# Patient Record
Sex: FEMALE | Race: WHITE | NOT HISPANIC OR LATINO | URBAN - METROPOLITAN AREA
[De-identification: names, ages, dates, MRNs, and addresses within clinical notes are randomized per-mention and may not be internally consistent; named-entity substitution may affect disease eponyms.]

---

## 2017-10-24 ENCOUNTER — OUTPATIENT (OUTPATIENT)
Dept: OUTPATIENT SERVICES | Facility: HOSPITAL | Age: 62
LOS: 1 days | Discharge: HOME | End: 2017-10-24

## 2017-10-27 DIAGNOSIS — Z85.3 PERSONAL HISTORY OF MALIGNANT NEOPLASM OF BREAST: ICD-10-CM

## 2017-10-27 DIAGNOSIS — Z12.11 ENCOUNTER FOR SCREENING FOR MALIGNANT NEOPLASM OF COLON: ICD-10-CM

## 2017-10-27 DIAGNOSIS — K64.1 SECOND DEGREE HEMORRHOIDS: ICD-10-CM

## 2017-10-27 DIAGNOSIS — E11.9 TYPE 2 DIABETES MELLITUS WITHOUT COMPLICATIONS: ICD-10-CM

## 2017-10-27 DIAGNOSIS — E78.00 PURE HYPERCHOLESTEROLEMIA, UNSPECIFIED: ICD-10-CM

## 2017-10-27 DIAGNOSIS — D12.5 BENIGN NEOPLASM OF SIGMOID COLON: ICD-10-CM

## 2017-10-27 DIAGNOSIS — Z86.010 PERSONAL HISTORY OF COLONIC POLYPS: ICD-10-CM

## 2022-12-06 ENCOUNTER — OUTPATIENT (OUTPATIENT)
Dept: OUTPATIENT SERVICES | Facility: HOSPITAL | Age: 67
LOS: 1 days | Discharge: HOME | End: 2022-12-06

## 2022-12-06 VITALS
OXYGEN SATURATION: 97 % | SYSTOLIC BLOOD PRESSURE: 131 MMHG | DIASTOLIC BLOOD PRESSURE: 78 MMHG | RESPIRATION RATE: 18 BRPM | HEART RATE: 70 BPM

## 2022-12-06 VITALS
RESPIRATION RATE: 18 BRPM | SYSTOLIC BLOOD PRESSURE: 175 MMHG | TEMPERATURE: 97 F | DIASTOLIC BLOOD PRESSURE: 95 MMHG | HEIGHT: 63 IN | WEIGHT: 195.11 LBS | HEART RATE: 81 BPM

## 2022-12-06 DIAGNOSIS — Z90.89 ACQUIRED ABSENCE OF OTHER ORGANS: Chronic | ICD-10-CM

## 2022-12-06 DIAGNOSIS — Z90.12 ACQUIRED ABSENCE OF LEFT BREAST AND NIPPLE: Chronic | ICD-10-CM

## 2022-12-06 PROCEDURE — 88305 TISSUE EXAM BY PATHOLOGIST: CPT | Mod: 26

## 2022-12-06 PROCEDURE — 88312 SPECIAL STAINS GROUP 1: CPT | Mod: 26

## 2022-12-06 NOTE — H&P PST ADULT - ASSESSMENT
Patient with STEVEN and Colon polyps . Patient for EGD/ Colonoscopy. Risks Benefits, Alternatives, explained.

## 2022-12-06 NOTE — ASU PATIENT PROFILE, ADULT - FALL HARM RISK - UNIVERSAL INTERVENTIONS
Bed in lowest position, wheels locked, appropriate side rails in place/Call bell, personal items and telephone in reach/Instruct patient to call for assistance before getting out of bed or chair/Non-slip footwear when patient is out of bed/[place] to call system/Physically safe environment - no spills, clutter or unnecessary equipment/Purposeful Proactive Rounding/Room/bathroom lighting operational, light cord in reach

## 2022-12-06 NOTE — ASU PATIENT PROFILE, ADULT - NSICDXPASTSURGICALHX_GEN_ALL_CORE_FT
PAST SURGICAL HISTORY:  H/O left mastectomy     History of tonsillectomy     Single delivery by  section

## 2022-12-06 NOTE — ASU DISCHARGE PLAN (ADULT/PEDIATRIC) - CARE PROVIDER_API CALL
Gamaliel Motley (DO)  Gastroenterology; Internal Medicine  [address] [place] [address]  Phone: [phone]  Fax: [phone]  Established Patient  Follow Up Time:

## 2022-12-06 NOTE — ASU DISCHARGE PLAN (ADULT/PEDIATRIC) - NS MD DC FALL RISK RISK
For information on Fall & Injury Prevention, visit: [email or web address] OR  [email or web address] OR  https://www.cdc.gov/steadi/patient.html

## 2022-12-06 NOTE — CHART NOTE - NSCHARTNOTEFT_GEN_A_CORE
PACU ANESTHESIA ADMISSION NOTE      Procedure:   Post op diagnosis:      ____  Intubated  TV:______       Rate: ______      FiO2: ______    __x__  Patent Airway    __x__  Full return of protective reflexes    __x__  Full recovery from anesthesia / back to baseline status    Vitals:  T(C): 36.2 (12-06-22 @ 10:30), Max: 36.2 (12-06-22 @ 10:30)  HR: 81 (12-06-22 @ 10:30) (81 - 81)  BP: 175/95 (12-06-22 @ 10:30) (175/95 - 175/95)  RR: 18 (12-06-22 @ 10:30) (18 - 18)  SpO2: --    Mental Status:  __x__ Awake   ___x__ Alert   _____ Drowsy   _____ Sedated    Nausea/Vomiting:  __x__ NO  ______Yes,   See Post - Op Orders          Pain Scale (0-10):  _____    Treatment: ____ None    __x__ See Post - Op/PCA Orders    Post - Operative Fluids:   ____ Oral   __x__ See Post - Op Orders    Plan: Discharge:   ____Home       _____Floor     _____Critical Care    _____  Other:_________________    Comments: Patient had smooth intraoperative event, no anesthesia complication.  PACU Vital signs: HR:   87         BP:     117   /  71        RR:   17          O2 Sat:  97     %     Temp 97.8

## 2022-12-07 LAB
SURGICAL PATHOLOGY STUDY: SIGNIFICANT CHANGE UP
SURGICAL PATHOLOGY STUDY: SIGNIFICANT CHANGE UP

## 2022-12-08 DIAGNOSIS — K21.00 GASTRO-ESOPHAGEAL REFLUX DISEASE WITH ESOPHAGITIS, WITHOUT BLEEDING: ICD-10-CM

## 2022-12-08 DIAGNOSIS — E11.9 TYPE 2 DIABETES MELLITUS WITHOUT COMPLICATIONS: ICD-10-CM

## 2022-12-08 DIAGNOSIS — K29.50 UNSPECIFIED CHRONIC GASTRITIS WITHOUT BLEEDING: ICD-10-CM

## 2022-12-08 DIAGNOSIS — K64.8 OTHER HEMORRHOIDS: ICD-10-CM

## 2022-12-08 DIAGNOSIS — K29.80 DUODENITIS WITHOUT BLEEDING: ICD-10-CM

## 2022-12-08 DIAGNOSIS — K57.30 DIVERTICULOSIS OF LARGE INTESTINE WITHOUT PERFORATION OR ABSCESS WITHOUT BLEEDING: ICD-10-CM

## 2022-12-08 DIAGNOSIS — K31.7 POLYP OF STOMACH AND DUODENUM: ICD-10-CM

## 2022-12-08 DIAGNOSIS — E78.00 PURE HYPERCHOLESTEROLEMIA, UNSPECIFIED: ICD-10-CM

## 2022-12-08 DIAGNOSIS — Z79.84 LONG TERM (CURRENT) USE OF ORAL HYPOGLYCEMIC DRUGS: ICD-10-CM

## 2022-12-08 DIAGNOSIS — Z91.013 ALLERGY TO SEAFOOD: ICD-10-CM

## 2022-12-08 DIAGNOSIS — Z85.79 PERSONAL HISTORY OF OTHER MALIGNANT NEOPLASMS OF LYMPHOID, HEMATOPOIETIC AND RELATED TISSUES: ICD-10-CM

## 2022-12-08 DIAGNOSIS — K63.5 POLYP OF COLON: ICD-10-CM

## 2022-12-08 DIAGNOSIS — D50.9 IRON DEFICIENCY ANEMIA, UNSPECIFIED: ICD-10-CM

## 2023-04-28 NOTE — PRE-ANESTHESIA EVALUATION ADULT - NSANTHAIRWAYFT_ENT_ALL_CORE
Despite multiple, daily attempts since 4/5/23, unable to reach patient to question him on 4/5/23 out of range INR. We will continue to try to reach patient to r/s INR asap. We will need a CURRENT INR in order to make proper adjustments in his coumadin dose.   I will close this encounter for administrative purposes.    FROM Cervical spine, TMD > 2 FB, Normal oral aperture.

## 2024-06-28 ENCOUNTER — INPATIENT (INPATIENT)
Facility: HOSPITAL | Age: 69
LOS: 3 days | Discharge: HOME | DRG: 440 | End: 2024-07-02
Attending: SURGERY | Admitting: SURGERY
Payer: MEDICARE

## 2024-06-28 VITALS
OXYGEN SATURATION: 99 % | WEIGHT: 186.07 LBS | HEART RATE: 90 BPM | RESPIRATION RATE: 19 BRPM | DIASTOLIC BLOOD PRESSURE: 62 MMHG | SYSTOLIC BLOOD PRESSURE: 104 MMHG | TEMPERATURE: 98 F

## 2024-06-28 DIAGNOSIS — Z90.12 ACQUIRED ABSENCE OF LEFT BREAST AND NIPPLE: Chronic | ICD-10-CM

## 2024-06-28 DIAGNOSIS — Z90.89 ACQUIRED ABSENCE OF OTHER ORGANS: Chronic | ICD-10-CM

## 2024-06-28 LAB
ALBUMIN SERPL ELPH-MCNC: 4.3 G/DL — SIGNIFICANT CHANGE UP (ref 3.5–5.2)
ALP SERPL-CCNC: 145 U/L — HIGH (ref 30–115)
ALT FLD-CCNC: 77 U/L — HIGH (ref 0–41)
ANION GAP SERPL CALC-SCNC: 11 MMOL/L — SIGNIFICANT CHANGE UP (ref 7–14)
APPEARANCE UR: CLEAR — SIGNIFICANT CHANGE UP
AST SERPL-CCNC: 48 U/L — HIGH (ref 0–41)
BASOPHILS # BLD AUTO: 0.02 K/UL — SIGNIFICANT CHANGE UP (ref 0–0.2)
BASOPHILS NFR BLD AUTO: 0.4 % — SIGNIFICANT CHANGE UP (ref 0–1)
BILIRUB DIRECT SERPL-MCNC: 0.4 MG/DL — HIGH (ref 0–0.3)
BILIRUB INDIRECT FLD-MCNC: 1.5 MG/DL — HIGH (ref 0.2–1.2)
BILIRUB SERPL-MCNC: 1.9 MG/DL — HIGH (ref 0.2–1.2)
BILIRUB UR-MCNC: NEGATIVE — SIGNIFICANT CHANGE UP
BUN SERPL-MCNC: 13 MG/DL — SIGNIFICANT CHANGE UP (ref 10–20)
CALCIUM SERPL-MCNC: 9.7 MG/DL — SIGNIFICANT CHANGE UP (ref 8.4–10.4)
CHLORIDE SERPL-SCNC: 97 MMOL/L — LOW (ref 98–110)
CO2 SERPL-SCNC: 23 MMOL/L — SIGNIFICANT CHANGE UP (ref 17–32)
COLOR SPEC: ABNORMAL
CREAT SERPL-MCNC: 0.7 MG/DL — SIGNIFICANT CHANGE UP (ref 0.7–1.5)
DIFF PNL FLD: NEGATIVE — SIGNIFICANT CHANGE UP
EGFR: 94 ML/MIN/1.73M2 — SIGNIFICANT CHANGE UP
EOSINOPHIL # BLD AUTO: 0.09 K/UL — SIGNIFICANT CHANGE UP (ref 0–0.7)
EOSINOPHIL NFR BLD AUTO: 1.8 % — SIGNIFICANT CHANGE UP (ref 0–8)
GLUCOSE SERPL-MCNC: 238 MG/DL — HIGH (ref 70–99)
GLUCOSE UR QL: >=1000 MG/DL
HCT VFR BLD CALC: 37.8 % — SIGNIFICANT CHANGE UP (ref 37–47)
HGB BLD-MCNC: 12.4 G/DL — SIGNIFICANT CHANGE UP (ref 12–16)
IMM GRANULOCYTES NFR BLD AUTO: 0.6 % — HIGH (ref 0.1–0.3)
KETONES UR-MCNC: NEGATIVE MG/DL — SIGNIFICANT CHANGE UP
LACTATE SERPL-SCNC: 2.5 MMOL/L — HIGH (ref 0.7–2)
LEUKOCYTE ESTERASE UR-ACNC: NEGATIVE — SIGNIFICANT CHANGE UP
LIDOCAIN IGE QN: 2055 U/L — HIGH (ref 7–60)
LYMPHOCYTES # BLD AUTO: 1.03 K/UL — LOW (ref 1.2–3.4)
LYMPHOCYTES # BLD AUTO: 20.6 % — SIGNIFICANT CHANGE UP (ref 20.5–51.1)
MCHC RBC-ENTMCNC: 29 PG — SIGNIFICANT CHANGE UP (ref 27–31)
MCHC RBC-ENTMCNC: 32.8 G/DL — SIGNIFICANT CHANGE UP (ref 32–37)
MCV RBC AUTO: 88.5 FL — SIGNIFICANT CHANGE UP (ref 81–99)
MONOCYTES # BLD AUTO: 0.73 K/UL — HIGH (ref 0.1–0.6)
MONOCYTES NFR BLD AUTO: 14.6 % — HIGH (ref 1.7–9.3)
NEUTROPHILS # BLD AUTO: 3.09 K/UL — SIGNIFICANT CHANGE UP (ref 1.4–6.5)
NEUTROPHILS NFR BLD AUTO: 62 % — SIGNIFICANT CHANGE UP (ref 42.2–75.2)
NITRITE UR-MCNC: NEGATIVE — SIGNIFICANT CHANGE UP
NRBC # BLD: 0 /100 WBCS — SIGNIFICANT CHANGE UP (ref 0–0)
PH UR: 5.5 — SIGNIFICANT CHANGE UP (ref 5–8)
PLATELET # BLD AUTO: 128 K/UL — LOW (ref 130–400)
PMV BLD: 12.6 FL — HIGH (ref 7.4–10.4)
POTASSIUM SERPL-MCNC: 4.3 MMOL/L — SIGNIFICANT CHANGE UP (ref 3.5–5)
POTASSIUM SERPL-SCNC: 4.3 MMOL/L — SIGNIFICANT CHANGE UP (ref 3.5–5)
PROT SERPL-MCNC: 6.5 G/DL — SIGNIFICANT CHANGE UP (ref 6–8)
PROT UR-MCNC: SIGNIFICANT CHANGE UP MG/DL
RBC # BLD: 4.27 M/UL — SIGNIFICANT CHANGE UP (ref 4.2–5.4)
RBC # FLD: 18.5 % — HIGH (ref 11.5–14.5)
SODIUM SERPL-SCNC: 131 MMOL/L — LOW (ref 135–146)
SP GR SPEC: >1.03 — HIGH (ref 1–1.03)
UROBILINOGEN FLD QL: 0.2 MG/DL — SIGNIFICANT CHANGE UP (ref 0.2–1)
WBC # BLD: 4.99 K/UL — SIGNIFICANT CHANGE UP (ref 4.8–10.8)
WBC # FLD AUTO: 4.99 K/UL — SIGNIFICANT CHANGE UP (ref 4.8–10.8)

## 2024-06-28 PROCEDURE — 99285 EMERGENCY DEPT VISIT HI MDM: CPT

## 2024-06-28 RX ORDER — DEXTROSE MONOHYDRATE AND SODIUM CHLORIDE 5; .3 G/100ML; G/100ML
1000 INJECTION, SOLUTION INTRAVENOUS ONCE
Refills: 0 | Status: COMPLETED | OUTPATIENT
Start: 2024-06-28 | End: 2024-06-28

## 2024-06-28 RX ORDER — ACETAMINOPHEN 325 MG
650 TABLET ORAL ONCE
Refills: 0 | Status: COMPLETED | OUTPATIENT
Start: 2024-06-28 | End: 2024-06-28

## 2024-06-28 RX ADMIN — DEXTROSE MONOHYDRATE AND SODIUM CHLORIDE 1000 MILLILITER(S): 5; .3 INJECTION, SOLUTION INTRAVENOUS at 23:33

## 2024-06-28 RX ADMIN — DEXTROSE MONOHYDRATE AND SODIUM CHLORIDE 1000 MILLILITER(S): 5; .3 INJECTION, SOLUTION INTRAVENOUS at 23:36

## 2024-06-28 RX ADMIN — Medication 650 MILLIGRAM(S): at 21:46

## 2024-06-28 RX ADMIN — DEXTROSE MONOHYDRATE AND SODIUM CHLORIDE 1000 MILLILITER(S): 5; .3 INJECTION, SOLUTION INTRAVENOUS at 22:33

## 2024-06-29 DIAGNOSIS — K85.90 ACUTE PANCREATITIS WITHOUT NECROSIS OR INFECTION, UNSPECIFIED: ICD-10-CM

## 2024-06-29 LAB
A1C WITH ESTIMATED AVERAGE GLUCOSE RESULT: 7.1 % — HIGH (ref 4–5.6)
ACANTHOCYTES BLD QL SMEAR: SLIGHT — SIGNIFICANT CHANGE UP
ALBUMIN SERPL ELPH-MCNC: 3.5 G/DL — SIGNIFICANT CHANGE UP (ref 3.5–5.2)
ALBUMIN SERPL ELPH-MCNC: 3.7 G/DL — SIGNIFICANT CHANGE UP (ref 3.5–5.2)
ALLERGY+IMMUNOLOGY DIAG STUDY NOTE: SIGNIFICANT CHANGE UP
ALP SERPL-CCNC: 101 U/L — SIGNIFICANT CHANGE UP (ref 30–115)
ALP SERPL-CCNC: 95 U/L — SIGNIFICANT CHANGE UP (ref 30–115)
ALT FLD-CCNC: 44 U/L — HIGH (ref 0–41)
ALT FLD-CCNC: 52 U/L — HIGH (ref 0–41)
ANION GAP SERPL CALC-SCNC: 10 MMOL/L — SIGNIFICANT CHANGE UP (ref 7–14)
ANION GAP SERPL CALC-SCNC: 9 MMOL/L — SIGNIFICANT CHANGE UP (ref 7–14)
APTT BLD: 27.4 SEC — SIGNIFICANT CHANGE UP (ref 27–39.2)
AST SERPL-CCNC: 17 U/L — SIGNIFICANT CHANGE UP (ref 0–41)
AST SERPL-CCNC: 21 U/L — SIGNIFICANT CHANGE UP (ref 0–41)
BASOPHILS # BLD AUTO: 0 K/UL — SIGNIFICANT CHANGE UP (ref 0–0.2)
BASOPHILS # BLD AUTO: 0.01 K/UL — SIGNIFICANT CHANGE UP (ref 0–0.2)
BASOPHILS NFR BLD AUTO: 0 % — SIGNIFICANT CHANGE UP (ref 0–1)
BASOPHILS NFR BLD AUTO: 0.4 % — SIGNIFICANT CHANGE UP (ref 0–1)
BILIRUB DIRECT SERPL-MCNC: 0.4 MG/DL — HIGH (ref 0–0.3)
BILIRUB DIRECT SERPL-MCNC: 0.4 MG/DL — HIGH (ref 0–0.3)
BILIRUB INDIRECT FLD-MCNC: 1.4 MG/DL — HIGH (ref 0.2–1.2)
BILIRUB INDIRECT FLD-MCNC: 1.4 MG/DL — HIGH (ref 0.2–1.2)
BILIRUB SERPL-MCNC: 1.8 MG/DL — HIGH (ref 0.2–1.2)
BILIRUB SERPL-MCNC: 1.8 MG/DL — HIGH (ref 0.2–1.2)
BLD GP AB SCN SERPL QL: SIGNIFICANT CHANGE UP
BUN SERPL-MCNC: 7 MG/DL — LOW (ref 10–20)
BUN SERPL-MCNC: 8 MG/DL — LOW (ref 10–20)
BURR CELLS BLD QL SMEAR: PRESENT — SIGNIFICANT CHANGE UP
CALCIUM SERPL-MCNC: 8.3 MG/DL — LOW (ref 8.4–10.4)
CALCIUM SERPL-MCNC: 8.3 MG/DL — LOW (ref 8.4–10.5)
CHLORIDE SERPL-SCNC: 103 MMOL/L — SIGNIFICANT CHANGE UP (ref 98–110)
CHLORIDE SERPL-SCNC: 105 MMOL/L — SIGNIFICANT CHANGE UP (ref 98–110)
CHOLEST SERPL-MCNC: 155 MG/DL — SIGNIFICANT CHANGE UP
CO2 SERPL-SCNC: 23 MMOL/L — SIGNIFICANT CHANGE UP (ref 17–32)
CO2 SERPL-SCNC: 23 MMOL/L — SIGNIFICANT CHANGE UP (ref 17–32)
CREAT SERPL-MCNC: 0.5 MG/DL — LOW (ref 0.7–1.5)
CREAT SERPL-MCNC: 0.6 MG/DL — LOW (ref 0.7–1.5)
DIR ANTIGLOB POLYSPECIFIC INTERPRETATION: SIGNIFICANT CHANGE UP
EGFR: 101 ML/MIN/1.73M2 — SIGNIFICANT CHANGE UP
EGFR: 97 ML/MIN/1.73M2 — SIGNIFICANT CHANGE UP
EOSINOPHIL # BLD AUTO: 0.13 K/UL — SIGNIFICANT CHANGE UP (ref 0–0.7)
EOSINOPHIL # BLD AUTO: 0.19 K/UL — SIGNIFICANT CHANGE UP (ref 0–0.7)
EOSINOPHIL NFR BLD AUTO: 5.2 % — SIGNIFICANT CHANGE UP (ref 0–8)
EOSINOPHIL NFR BLD AUTO: 6.7 % — SIGNIFICANT CHANGE UP (ref 0–8)
ESTIMATED AVERAGE GLUCOSE: 157 MG/DL — HIGH (ref 68–114)
GLUCOSE BLDC GLUCOMTR-MCNC: 155 MG/DL — HIGH (ref 70–99)
GLUCOSE BLDC GLUCOMTR-MCNC: 173 MG/DL — HIGH (ref 70–99)
GLUCOSE BLDC GLUCOMTR-MCNC: 184 MG/DL — HIGH (ref 70–99)
GLUCOSE BLDC GLUCOMTR-MCNC: 186 MG/DL — HIGH (ref 70–99)
GLUCOSE SERPL-MCNC: 155 MG/DL — HIGH (ref 70–99)
GLUCOSE SERPL-MCNC: 194 MG/DL — HIGH (ref 70–99)
HCT VFR BLD CALC: 29.6 % — LOW (ref 37–47)
HCT VFR BLD CALC: 31 % — LOW (ref 37–47)
HDLC SERPL-MCNC: 43 MG/DL — LOW
HGB BLD-MCNC: 10 G/DL — LOW (ref 12–16)
HGB BLD-MCNC: 9.7 G/DL — LOW (ref 12–16)
IMM GRANULOCYTES NFR BLD AUTO: 0 % — LOW (ref 0.1–0.3)
INR BLD: 1.14 RATIO — SIGNIFICANT CHANGE UP (ref 0.65–1.3)
LIPID PNL WITH DIRECT LDL SERPL: 76 MG/DL — SIGNIFICANT CHANGE UP
LYMPHOCYTES # BLD AUTO: 0.49 K/UL — LOW (ref 1.2–3.4)
LYMPHOCYTES # BLD AUTO: 0.84 K/UL — LOW (ref 1.2–3.4)
LYMPHOCYTES # BLD AUTO: 19 % — LOW (ref 20.5–51.1)
LYMPHOCYTES # BLD AUTO: 29.7 % — SIGNIFICANT CHANGE UP (ref 20.5–51.1)
MAGNESIUM SERPL-MCNC: 1.8 MG/DL — SIGNIFICANT CHANGE UP (ref 1.8–2.4)
MAGNESIUM SERPL-MCNC: 1.8 MG/DL — SIGNIFICANT CHANGE UP (ref 1.8–2.4)
MANUAL SMEAR VERIFICATION: SIGNIFICANT CHANGE UP
MCHC RBC-ENTMCNC: 28.3 PG — SIGNIFICANT CHANGE UP (ref 27–31)
MCHC RBC-ENTMCNC: 28.8 PG — SIGNIFICANT CHANGE UP (ref 27–31)
MCHC RBC-ENTMCNC: 32.3 G/DL — SIGNIFICANT CHANGE UP (ref 32–37)
MCHC RBC-ENTMCNC: 32.8 G/DL — SIGNIFICANT CHANGE UP (ref 32–37)
MCV RBC AUTO: 87.8 FL — SIGNIFICANT CHANGE UP (ref 81–99)
MCV RBC AUTO: 87.8 FL — SIGNIFICANT CHANGE UP (ref 81–99)
MONOCYTES # BLD AUTO: 0.26 K/UL — SIGNIFICANT CHANGE UP (ref 0.1–0.6)
MONOCYTES # BLD AUTO: 0.52 K/UL — SIGNIFICANT CHANGE UP (ref 0.1–0.6)
MONOCYTES NFR BLD AUTO: 10.3 % — HIGH (ref 1.7–9.3)
MONOCYTES NFR BLD AUTO: 18.4 % — HIGH (ref 1.7–9.3)
NEUTROPHILS # BLD AUTO: 1.27 K/UL — LOW (ref 1.4–6.5)
NEUTROPHILS # BLD AUTO: 1.64 K/UL — SIGNIFICANT CHANGE UP (ref 1.4–6.5)
NEUTROPHILS NFR BLD AUTO: 44.8 % — SIGNIFICANT CHANGE UP (ref 42.2–75.2)
NEUTROPHILS NFR BLD AUTO: 63.8 % — SIGNIFICANT CHANGE UP (ref 42.2–75.2)
NON HDL CHOLESTEROL: 112 MG/DL — SIGNIFICANT CHANGE UP
NRBC # BLD: 0 /100 WBCS — SIGNIFICANT CHANGE UP (ref 0–0)
PHOSPHATE SERPL-MCNC: 1.8 MG/DL — LOW (ref 2.1–4.9)
PHOSPHATE SERPL-MCNC: 1.9 MG/DL — LOW (ref 2.1–4.9)
PLAT MORPH BLD: NORMAL — SIGNIFICANT CHANGE UP
PLATELET # BLD AUTO: 74 K/UL — LOW (ref 130–400)
PLATELET # BLD AUTO: 97 K/UL — LOW (ref 130–400)
PMV BLD: 12.5 FL — HIGH (ref 7.4–10.4)
PMV BLD: SIGNIFICANT CHANGE UP (ref 7.4–10.4)
POIKILOCYTOSIS BLD QL AUTO: SLIGHT — SIGNIFICANT CHANGE UP
POLYCHROMASIA BLD QL SMEAR: SLIGHT — SIGNIFICANT CHANGE UP
POTASSIUM SERPL-MCNC: 3.6 MMOL/L — SIGNIFICANT CHANGE UP (ref 3.5–5)
POTASSIUM SERPL-MCNC: 3.9 MMOL/L — SIGNIFICANT CHANGE UP (ref 3.5–5)
POTASSIUM SERPL-SCNC: 3.6 MMOL/L — SIGNIFICANT CHANGE UP (ref 3.5–5)
POTASSIUM SERPL-SCNC: 3.9 MMOL/L — SIGNIFICANT CHANGE UP (ref 3.5–5)
PROT SERPL-MCNC: 5 G/DL — LOW (ref 6–8)
PROT SERPL-MCNC: 5.3 G/DL — LOW (ref 6–8)
PROTHROM AB SERPL-ACNC: 13 SEC — HIGH (ref 9.95–12.87)
RBC # BLD: 3.37 M/UL — LOW (ref 4.2–5.4)
RBC # BLD: 3.53 M/UL — LOW (ref 4.2–5.4)
RBC # FLD: 18.6 % — HIGH (ref 11.5–14.5)
RBC # FLD: 18.9 % — HIGH (ref 11.5–14.5)
RBC BLD AUTO: ABNORMAL
SODIUM SERPL-SCNC: 135 MMOL/L — SIGNIFICANT CHANGE UP (ref 135–146)
SODIUM SERPL-SCNC: 138 MMOL/L — SIGNIFICANT CHANGE UP (ref 135–146)
TRIGL SERPL-MCNC: 180 MG/DL — HIGH
VARIANT LYMPHS # BLD: 1.7 % — SIGNIFICANT CHANGE UP (ref 0–5)
WBC # BLD: 2.57 K/UL — LOW (ref 4.8–10.8)
WBC # BLD: 2.83 K/UL — LOW (ref 4.8–10.8)
WBC # FLD AUTO: 2.57 K/UL — LOW (ref 4.8–10.8)
WBC # FLD AUTO: 2.83 K/UL — LOW (ref 4.8–10.8)

## 2024-06-29 PROCEDURE — 74177 CT ABD & PELVIS W/CONTRAST: CPT | Mod: 26,MC

## 2024-06-29 PROCEDURE — 76705 ECHO EXAM OF ABDOMEN: CPT | Mod: 26

## 2024-06-29 PROCEDURE — 88304 TISSUE EXAM BY PATHOLOGIST: CPT

## 2024-06-29 PROCEDURE — 84100 ASSAY OF PHOSPHORUS: CPT

## 2024-06-29 PROCEDURE — 85025 COMPLETE CBC W/AUTO DIFF WBC: CPT

## 2024-06-29 PROCEDURE — 83036 HEMOGLOBIN GLYCOSYLATED A1C: CPT

## 2024-06-29 PROCEDURE — 85610 PROTHROMBIN TIME: CPT

## 2024-06-29 PROCEDURE — 36415 COLL VENOUS BLD VENIPUNCTURE: CPT

## 2024-06-29 PROCEDURE — 86870 RBC ANTIBODY IDENTIFICATION: CPT

## 2024-06-29 PROCEDURE — C1889: CPT

## 2024-06-29 PROCEDURE — 0001U RBC DNA HEA 35 AG 11 BLD GRP: CPT

## 2024-06-29 PROCEDURE — 86880 COOMBS TEST DIRECT: CPT

## 2024-06-29 PROCEDURE — 80048 BASIC METABOLIC PNL TOTAL CA: CPT

## 2024-06-29 PROCEDURE — S2900: CPT

## 2024-06-29 PROCEDURE — 86900 BLOOD TYPING SEROLOGIC ABO: CPT

## 2024-06-29 PROCEDURE — 99223 1ST HOSP IP/OBS HIGH 75: CPT

## 2024-06-29 PROCEDURE — 86850 RBC ANTIBODY SCREEN: CPT

## 2024-06-29 PROCEDURE — 80076 HEPATIC FUNCTION PANEL: CPT

## 2024-06-29 PROCEDURE — 85730 THROMBOPLASTIN TIME PARTIAL: CPT

## 2024-06-29 PROCEDURE — 82962 GLUCOSE BLOOD TEST: CPT

## 2024-06-29 PROCEDURE — 86077 PHYS BLOOD BANK SERV XMATCH: CPT

## 2024-06-29 PROCEDURE — C9399: CPT

## 2024-06-29 PROCEDURE — 86901 BLOOD TYPING SEROLOGIC RH(D): CPT

## 2024-06-29 PROCEDURE — 83735 ASSAY OF MAGNESIUM: CPT

## 2024-06-29 RX ORDER — PIPERACILLIN SODIUM AND TAZOBACTAM SODIUM 3; .375 G/15ML; G/15ML
3.38 INJECTION, POWDER, LYOPHILIZED, FOR SOLUTION INTRAVENOUS ONCE
Refills: 0 | Status: COMPLETED | OUTPATIENT
Start: 2024-06-29 | End: 2024-06-29

## 2024-06-29 RX ORDER — DEXTROSE 30 % IN WATER 30 %
12.5 VIAL (ML) INTRAVENOUS ONCE
Refills: 0 | Status: DISCONTINUED | OUTPATIENT
Start: 2024-06-29 | End: 2024-07-01

## 2024-06-29 RX ORDER — DEXTROSE MONOHYDRATE AND SODIUM CHLORIDE 5; .3 G/100ML; G/100ML
1000 INJECTION, SOLUTION INTRAVENOUS
Refills: 0 | Status: DISCONTINUED | OUTPATIENT
Start: 2024-06-29 | End: 2024-07-01

## 2024-06-29 RX ORDER — MONTELUKAST SODIUM 10 MG/1
10 TABLET, FILM COATED ORAL DAILY
Refills: 0 | Status: DISCONTINUED | OUTPATIENT
Start: 2024-06-29 | End: 2024-07-01

## 2024-06-29 RX ORDER — ACYCLOVIR SODIUM 50 MG/ML
400 VIAL (ML) INTRAVENOUS
Refills: 0 | Status: DISCONTINUED | OUTPATIENT
Start: 2024-06-29 | End: 2024-07-01

## 2024-06-29 RX ORDER — ASPIRIN 325 MG/1
81 TABLET, FILM COATED ORAL DAILY
Refills: 0 | Status: DISCONTINUED | OUTPATIENT
Start: 2024-06-29 | End: 2024-07-01

## 2024-06-29 RX ORDER — DEXTROSE MONOHYDRATE AND SODIUM CHLORIDE 5; .3 G/100ML; G/100ML
1000 INJECTION, SOLUTION INTRAVENOUS
Refills: 0 | Status: DISCONTINUED | OUTPATIENT
Start: 2024-06-29 | End: 2024-06-30

## 2024-06-29 RX ORDER — DEXTROSE MONOHYDRATE AND SODIUM CHLORIDE 5; .3 G/100ML; G/100ML
1000 INJECTION, SOLUTION INTRAVENOUS ONCE
Refills: 0 | Status: COMPLETED | OUTPATIENT
Start: 2024-06-29 | End: 2024-06-29

## 2024-06-29 RX ORDER — GLUCAGON HYDROCHLORIDE 1 MG/ML
1 INJECTION, POWDER, FOR SOLUTION INTRAMUSCULAR; INTRAVENOUS; SUBCUTANEOUS ONCE
Refills: 0 | Status: DISCONTINUED | OUTPATIENT
Start: 2024-06-29 | End: 2024-07-01

## 2024-06-29 RX ORDER — DEXTROSE 30 % IN WATER 30 %
15 VIAL (ML) INTRAVENOUS ONCE
Refills: 0 | Status: DISCONTINUED | OUTPATIENT
Start: 2024-06-29 | End: 2024-07-01

## 2024-06-29 RX ORDER — DEXTROSE MONOHYDRATE 100 MG/ML
125 INJECTION, SOLUTION INTRAVENOUS ONCE
Refills: 0 | Status: DISCONTINUED | OUTPATIENT
Start: 2024-06-29 | End: 2024-07-01

## 2024-06-29 RX ORDER — ENOXAPARIN SODIUM 100 MG/ML
40 INJECTION SUBCUTANEOUS EVERY 24 HOURS
Refills: 0 | Status: DISCONTINUED | OUTPATIENT
Start: 2024-06-29 | End: 2024-07-01

## 2024-06-29 RX ORDER — DEXTROSE 30 % IN WATER 30 %
25 VIAL (ML) INTRAVENOUS ONCE
Refills: 0 | Status: DISCONTINUED | OUTPATIENT
Start: 2024-06-29 | End: 2024-07-01

## 2024-06-29 RX ORDER — INSULIN LISPRO 100 [IU]/ML
INJECTION, SOLUTION SUBCUTANEOUS
Refills: 0 | Status: DISCONTINUED | OUTPATIENT
Start: 2024-06-29 | End: 2024-07-01

## 2024-06-29 RX ORDER — PANTOPRAZOLE SODIUM 40 MG/10ML
40 INJECTION, POWDER, FOR SOLUTION INTRAVENOUS
Refills: 0 | Status: DISCONTINUED | OUTPATIENT
Start: 2024-06-29 | End: 2024-07-01

## 2024-06-29 RX ORDER — POTASSIUM PHOSPHATE, MONOBASIC POTASSIUM PHOSPHATE, DIBASIC INJECTION, 236; 224 MG/ML; MG/ML
15 SOLUTION, CONCENTRATE INTRAVENOUS ONCE
Refills: 0 | Status: COMPLETED | OUTPATIENT
Start: 2024-06-29 | End: 2024-06-30

## 2024-06-29 RX ORDER — PIPERACILLIN SODIUM AND TAZOBACTAM SODIUM 3; .375 G/15ML; G/15ML
3.38 INJECTION, POWDER, LYOPHILIZED, FOR SOLUTION INTRAVENOUS EVERY 8 HOURS
Refills: 0 | Status: DISCONTINUED | OUTPATIENT
Start: 2024-06-29 | End: 2024-07-01

## 2024-06-29 RX ORDER — ACETAMINOPHEN 325 MG
650 TABLET ORAL EVERY 6 HOURS
Refills: 0 | Status: DISCONTINUED | OUTPATIENT
Start: 2024-06-29 | End: 2024-07-01

## 2024-06-29 RX ORDER — ATORVASTATIN CALCIUM 20 MG/1
10 TABLET, FILM COATED ORAL AT BEDTIME
Refills: 0 | Status: DISCONTINUED | OUTPATIENT
Start: 2024-06-29 | End: 2024-07-01

## 2024-06-29 RX ORDER — LOSARTAN POTASSIUM 100 MG/1
25 TABLET, FILM COATED ORAL DAILY
Refills: 0 | Status: DISCONTINUED | OUTPATIENT
Start: 2024-06-29 | End: 2024-07-01

## 2024-06-29 RX ORDER — INSULIN LISPRO 100 [IU]/ML
INJECTION, SOLUTION SUBCUTANEOUS EVERY 4 HOURS
Refills: 0 | Status: DISCONTINUED | OUTPATIENT
Start: 2024-06-29 | End: 2024-06-29

## 2024-06-29 RX ORDER — HYDROMORPHONE HCL 0.2 MG/ML
0.5 INJECTION, SOLUTION INTRAVENOUS EVERY 4 HOURS
Refills: 0 | Status: DISCONTINUED | OUTPATIENT
Start: 2024-06-29 | End: 2024-07-01

## 2024-06-29 RX ORDER — KETOROLAC TROMETHAMINE 30 MG/ML
15 INJECTION, SOLUTION INTRAMUSCULAR EVERY 6 HOURS
Refills: 0 | Status: DISCONTINUED | OUTPATIENT
Start: 2024-06-29 | End: 2024-07-01

## 2024-06-29 RX ADMIN — DEXTROSE MONOHYDRATE AND SODIUM CHLORIDE 1000 MILLILITER(S): 5; .3 INJECTION, SOLUTION INTRAVENOUS at 00:36

## 2024-06-29 RX ADMIN — Medication 400 MILLIGRAM(S): at 08:27

## 2024-06-29 RX ADMIN — DEXTROSE MONOHYDRATE AND SODIUM CHLORIDE 150 MILLILITER(S): 5; .3 INJECTION, SOLUTION INTRAVENOUS at 04:40

## 2024-06-29 RX ADMIN — ATORVASTATIN CALCIUM 10 MILLIGRAM(S): 20 TABLET, FILM COATED ORAL at 03:52

## 2024-06-29 RX ADMIN — PIPERACILLIN SODIUM AND TAZOBACTAM SODIUM 25 GRAM(S): 3; .375 INJECTION, POWDER, LYOPHILIZED, FOR SOLUTION INTRAVENOUS at 06:36

## 2024-06-29 RX ADMIN — DEXTROSE MONOHYDRATE AND SODIUM CHLORIDE 150 MILLILITER(S): 5; .3 INJECTION, SOLUTION INTRAVENOUS at 07:26

## 2024-06-29 RX ADMIN — ENOXAPARIN SODIUM 40 MILLIGRAM(S): 100 INJECTION SUBCUTANEOUS at 22:13

## 2024-06-29 RX ADMIN — ATORVASTATIN CALCIUM 10 MILLIGRAM(S): 20 TABLET, FILM COATED ORAL at 22:14

## 2024-06-29 RX ADMIN — INSULIN LISPRO 2: 100 INJECTION, SOLUTION SUBCUTANEOUS at 12:32

## 2024-06-29 RX ADMIN — PIPERACILLIN SODIUM AND TAZOBACTAM SODIUM 25 GRAM(S): 3; .375 INJECTION, POWDER, LYOPHILIZED, FOR SOLUTION INTRAVENOUS at 22:12

## 2024-06-29 RX ADMIN — Medication 400 MILLIGRAM(S): at 17:35

## 2024-06-29 RX ADMIN — PIPERACILLIN SODIUM AND TAZOBACTAM SODIUM 25 GRAM(S): 3; .375 INJECTION, POWDER, LYOPHILIZED, FOR SOLUTION INTRAVENOUS at 14:21

## 2024-06-29 RX ADMIN — PANTOPRAZOLE SODIUM 40 MILLIGRAM(S): 40 INJECTION, POWDER, FOR SOLUTION INTRAVENOUS at 07:26

## 2024-06-29 RX ADMIN — Medication 1 APPLICATION(S): at 06:18

## 2024-06-29 RX ADMIN — DEXTROSE MONOHYDRATE AND SODIUM CHLORIDE 1000 MILLILITER(S): 5; .3 INJECTION, SOLUTION INTRAVENOUS at 02:52

## 2024-06-29 RX ADMIN — INSULIN LISPRO 2: 100 INJECTION, SOLUTION SUBCUTANEOUS at 08:27

## 2024-06-29 RX ADMIN — PIPERACILLIN SODIUM AND TAZOBACTAM SODIUM 200 GRAM(S): 3; .375 INJECTION, POWDER, LYOPHILIZED, FOR SOLUTION INTRAVENOUS at 02:53

## 2024-06-29 RX ADMIN — DEXTROSE MONOHYDRATE AND SODIUM CHLORIDE 1000 MILLILITER(S): 5; .3 INJECTION, SOLUTION INTRAVENOUS at 03:52

## 2024-06-29 RX ADMIN — INSULIN LISPRO 2: 100 INJECTION, SOLUTION SUBCUTANEOUS at 17:35

## 2024-06-29 RX ADMIN — ASPIRIN 81 MILLIGRAM(S): 325 TABLET, FILM COATED ORAL at 03:52

## 2024-06-30 PROBLEM — E78.00 PURE HYPERCHOLESTEROLEMIA, UNSPECIFIED: Chronic | Status: ACTIVE | Noted: 2022-12-06

## 2024-06-30 PROBLEM — E11.9 TYPE 2 DIABETES MELLITUS WITHOUT COMPLICATIONS: Chronic | Status: ACTIVE | Noted: 2022-12-06

## 2024-06-30 LAB
ALBUMIN SERPL ELPH-MCNC: 3.8 G/DL — SIGNIFICANT CHANGE UP (ref 3.5–5.2)
ALP SERPL-CCNC: 107 U/L — SIGNIFICANT CHANGE UP (ref 30–115)
ALT FLD-CCNC: 37 U/L — SIGNIFICANT CHANGE UP (ref 0–41)
ANION GAP SERPL CALC-SCNC: 9 MMOL/L — SIGNIFICANT CHANGE UP (ref 7–14)
APTT BLD: 30.9 SEC — SIGNIFICANT CHANGE UP (ref 27–39.2)
AST SERPL-CCNC: 15 U/L — SIGNIFICANT CHANGE UP (ref 0–41)
BASOPHILS # BLD AUTO: 0.01 K/UL — SIGNIFICANT CHANGE UP (ref 0–0.2)
BASOPHILS NFR BLD AUTO: 0.4 % — SIGNIFICANT CHANGE UP (ref 0–1)
BILIRUB DIRECT SERPL-MCNC: 0.3 MG/DL — SIGNIFICANT CHANGE UP (ref 0–0.3)
BILIRUB INDIRECT FLD-MCNC: 1.5 MG/DL — HIGH (ref 0.2–1.2)
BILIRUB SERPL-MCNC: 1.8 MG/DL — HIGH (ref 0.2–1.2)
BUN SERPL-MCNC: 5 MG/DL — LOW (ref 10–20)
CALCIUM SERPL-MCNC: 8.5 MG/DL — SIGNIFICANT CHANGE UP (ref 8.4–10.4)
CHLORIDE SERPL-SCNC: 103 MMOL/L — SIGNIFICANT CHANGE UP (ref 98–110)
CO2 SERPL-SCNC: 23 MMOL/L — SIGNIFICANT CHANGE UP (ref 17–32)
CREAT SERPL-MCNC: 0.6 MG/DL — LOW (ref 0.7–1.5)
EGFR: 97 ML/MIN/1.73M2 — SIGNIFICANT CHANGE UP
EOSINOPHIL # BLD AUTO: 0.15 K/UL — SIGNIFICANT CHANGE UP (ref 0–0.7)
EOSINOPHIL NFR BLD AUTO: 6 % — SIGNIFICANT CHANGE UP (ref 0–8)
GLUCOSE BLDC GLUCOMTR-MCNC: 116 MG/DL — HIGH (ref 70–99)
GLUCOSE BLDC GLUCOMTR-MCNC: 129 MG/DL — HIGH (ref 70–99)
GLUCOSE BLDC GLUCOMTR-MCNC: 143 MG/DL — HIGH (ref 70–99)
GLUCOSE BLDC GLUCOMTR-MCNC: 146 MG/DL — HIGH (ref 70–99)
GLUCOSE SERPL-MCNC: 118 MG/DL — HIGH (ref 70–99)
HCT VFR BLD CALC: 33.7 % — LOW (ref 37–47)
HGB BLD-MCNC: 10.8 G/DL — LOW (ref 12–16)
IMM GRANULOCYTES NFR BLD AUTO: 0 % — LOW (ref 0.1–0.3)
INR BLD: 1.11 RATIO — SIGNIFICANT CHANGE UP (ref 0.65–1.3)
LYMPHOCYTES # BLD AUTO: 0.96 K/UL — LOW (ref 1.2–3.4)
LYMPHOCYTES # BLD AUTO: 38.7 % — SIGNIFICANT CHANGE UP (ref 20.5–51.1)
MAGNESIUM SERPL-MCNC: 2 MG/DL — SIGNIFICANT CHANGE UP (ref 1.8–2.4)
MCHC RBC-ENTMCNC: 28.4 PG — SIGNIFICANT CHANGE UP (ref 27–31)
MCHC RBC-ENTMCNC: 32 G/DL — SIGNIFICANT CHANGE UP (ref 32–37)
MCV RBC AUTO: 88.7 FL — SIGNIFICANT CHANGE UP (ref 81–99)
MONOCYTES # BLD AUTO: 0.44 K/UL — SIGNIFICANT CHANGE UP (ref 0.1–0.6)
MONOCYTES NFR BLD AUTO: 17.7 % — HIGH (ref 1.7–9.3)
NEUTROPHILS # BLD AUTO: 0.92 K/UL — LOW (ref 1.4–6.5)
NEUTROPHILS NFR BLD AUTO: 37.2 % — LOW (ref 42.2–75.2)
NRBC # BLD: 0 /100 WBCS — SIGNIFICANT CHANGE UP (ref 0–0)
PHOSPHATE SERPL-MCNC: 2 MG/DL — LOW (ref 2.1–4.9)
PLATELET # BLD AUTO: 84 K/UL — LOW (ref 130–400)
PMV BLD: 13.1 FL — HIGH (ref 7.4–10.4)
POTASSIUM SERPL-MCNC: 4.2 MMOL/L — SIGNIFICANT CHANGE UP (ref 3.5–5)
POTASSIUM SERPL-SCNC: 4.2 MMOL/L — SIGNIFICANT CHANGE UP (ref 3.5–5)
PROT SERPL-MCNC: 5.8 G/DL — LOW (ref 6–8)
PROTHROM AB SERPL-ACNC: 12.7 SEC — SIGNIFICANT CHANGE UP (ref 9.95–12.87)
RBC # BLD: 3.8 M/UL — LOW (ref 4.2–5.4)
RBC # FLD: 18.3 % — HIGH (ref 11.5–14.5)
SODIUM SERPL-SCNC: 135 MMOL/L — SIGNIFICANT CHANGE UP (ref 135–146)
WBC # BLD: 2.48 K/UL — LOW (ref 4.8–10.8)
WBC # FLD AUTO: 2.48 K/UL — LOW (ref 4.8–10.8)

## 2024-06-30 PROCEDURE — 99232 SBSQ HOSP IP/OBS MODERATE 35: CPT | Mod: 24,25

## 2024-06-30 RX ORDER — DEXTROSE MONOHYDRATE AND SODIUM CHLORIDE 5; .3 G/100ML; G/100ML
1000 INJECTION, SOLUTION INTRAVENOUS
Refills: 0 | Status: DISCONTINUED | OUTPATIENT
Start: 2024-06-30 | End: 2024-07-01

## 2024-06-30 RX ORDER — POTASSIUM PHOSPHATE, MONOBASIC POTASSIUM PHOSPHATE, DIBASIC INJECTION, 236; 224 MG/ML; MG/ML
30 SOLUTION, CONCENTRATE INTRAVENOUS ONCE
Refills: 0 | Status: COMPLETED | OUTPATIENT
Start: 2024-06-30 | End: 2024-07-01

## 2024-06-30 RX ORDER — POLYETHYLENE GLYCOL 3350 1 G/G
17 POWDER ORAL DAILY
Refills: 0 | Status: DISCONTINUED | OUTPATIENT
Start: 2024-06-30 | End: 2024-07-01

## 2024-06-30 RX ORDER — DEXTROSE MONOHYDRATE AND SODIUM CHLORIDE 5; .3 G/100ML; G/100ML
1000 INJECTION, SOLUTION INTRAVENOUS
Refills: 0 | Status: DISCONTINUED | OUTPATIENT
Start: 2024-06-30 | End: 2024-06-30

## 2024-06-30 RX ORDER — SENNOSIDES 8.6 MG
2 TABLET ORAL AT BEDTIME
Refills: 0 | Status: DISCONTINUED | OUTPATIENT
Start: 2024-06-30 | End: 2024-07-01

## 2024-06-30 RX ADMIN — LOSARTAN POTASSIUM 25 MILLIGRAM(S): 100 TABLET, FILM COATED ORAL at 05:37

## 2024-06-30 RX ADMIN — MONTELUKAST SODIUM 10 MILLIGRAM(S): 10 TABLET, FILM COATED ORAL at 11:03

## 2024-06-30 RX ADMIN — PIPERACILLIN SODIUM AND TAZOBACTAM SODIUM 25 GRAM(S): 3; .375 INJECTION, POWDER, LYOPHILIZED, FOR SOLUTION INTRAVENOUS at 05:37

## 2024-06-30 RX ADMIN — DEXTROSE MONOHYDRATE AND SODIUM CHLORIDE 120 MILLILITER(S): 5; .3 INJECTION, SOLUTION INTRAVENOUS at 22:49

## 2024-06-30 RX ADMIN — Medication 1 APPLICATION(S): at 06:08

## 2024-06-30 RX ADMIN — Medication 63.75 MILLIMOLE(S): at 00:17

## 2024-06-30 RX ADMIN — ENOXAPARIN SODIUM 40 MILLIGRAM(S): 100 INJECTION SUBCUTANEOUS at 21:23

## 2024-06-30 RX ADMIN — PIPERACILLIN SODIUM AND TAZOBACTAM SODIUM 25 GRAM(S): 3; .375 INJECTION, POWDER, LYOPHILIZED, FOR SOLUTION INTRAVENOUS at 21:19

## 2024-06-30 RX ADMIN — Medication 400 MILLIGRAM(S): at 17:15

## 2024-06-30 RX ADMIN — Medication 400 MILLIGRAM(S): at 05:37

## 2024-06-30 RX ADMIN — ATORVASTATIN CALCIUM 10 MILLIGRAM(S): 20 TABLET, FILM COATED ORAL at 21:22

## 2024-06-30 RX ADMIN — PIPERACILLIN SODIUM AND TAZOBACTAM SODIUM 25 GRAM(S): 3; .375 INJECTION, POWDER, LYOPHILIZED, FOR SOLUTION INTRAVENOUS at 13:48

## 2024-06-30 RX ADMIN — ASPIRIN 81 MILLIGRAM(S): 325 TABLET, FILM COATED ORAL at 11:03

## 2024-06-30 RX ADMIN — POTASSIUM PHOSPHATE, MONOBASIC POTASSIUM PHOSPHATE, DIBASIC INJECTION, 63.75 MILLIMOLE(S): 236; 224 SOLUTION, CONCENTRATE INTRAVENOUS at 03:06

## 2024-06-30 RX ADMIN — PANTOPRAZOLE SODIUM 40 MILLIGRAM(S): 40 INJECTION, POWDER, FOR SOLUTION INTRAVENOUS at 08:17

## 2024-07-01 ENCOUNTER — RESULT REVIEW (OUTPATIENT)
Age: 69
End: 2024-07-01

## 2024-07-01 LAB
A1C WITH ESTIMATED AVERAGE GLUCOSE RESULT: 7.3 % — HIGH (ref 4–5.6)
ALBUMIN SERPL ELPH-MCNC: 3.6 G/DL — SIGNIFICANT CHANGE UP (ref 3.5–5.2)
ALP SERPL-CCNC: 105 U/L — SIGNIFICANT CHANGE UP (ref 30–115)
ALT FLD-CCNC: 40 U/L — SIGNIFICANT CHANGE UP (ref 0–41)
ANION GAP SERPL CALC-SCNC: 8 MMOL/L — SIGNIFICANT CHANGE UP (ref 7–14)
AST SERPL-CCNC: 33 U/L — SIGNIFICANT CHANGE UP (ref 0–41)
BASOPHILS # BLD AUTO: 0.01 K/UL — SIGNIFICANT CHANGE UP (ref 0–0.2)
BASOPHILS NFR BLD AUTO: 0.4 % — SIGNIFICANT CHANGE UP (ref 0–1)
BILIRUB DIRECT SERPL-MCNC: 0.3 MG/DL — SIGNIFICANT CHANGE UP (ref 0–0.3)
BILIRUB INDIRECT FLD-MCNC: 1.1 MG/DL — SIGNIFICANT CHANGE UP (ref 0.2–1.2)
BILIRUB SERPL-MCNC: 1.4 MG/DL — HIGH (ref 0.2–1.2)
BUN SERPL-MCNC: 6 MG/DL — LOW (ref 10–20)
CALCIUM SERPL-MCNC: 7.9 MG/DL — LOW (ref 8.4–10.5)
CHLORIDE SERPL-SCNC: 105 MMOL/L — SIGNIFICANT CHANGE UP (ref 98–110)
CO2 SERPL-SCNC: 22 MMOL/L — SIGNIFICANT CHANGE UP (ref 17–32)
CREAT SERPL-MCNC: 0.5 MG/DL — LOW (ref 0.7–1.5)
EGFR: 101 ML/MIN/1.73M2 — SIGNIFICANT CHANGE UP
EOSINOPHIL # BLD AUTO: 0 K/UL — SIGNIFICANT CHANGE UP (ref 0–0.7)
EOSINOPHIL NFR BLD AUTO: 0 % — SIGNIFICANT CHANGE UP (ref 0–8)
ESTIMATED AVERAGE GLUCOSE: 163 MG/DL — HIGH (ref 68–114)
GLUCOSE BLDC GLUCOMTR-MCNC: 107 MG/DL — HIGH (ref 70–99)
GLUCOSE BLDC GLUCOMTR-MCNC: 118 MG/DL — HIGH (ref 70–99)
GLUCOSE BLDC GLUCOMTR-MCNC: 121 MG/DL — HIGH (ref 70–99)
GLUCOSE BLDC GLUCOMTR-MCNC: 160 MG/DL — HIGH (ref 70–99)
GLUCOSE BLDC GLUCOMTR-MCNC: 174 MG/DL — HIGH (ref 70–99)
GLUCOSE SERPL-MCNC: 186 MG/DL — HIGH (ref 70–99)
HCT VFR BLD CALC: 34 % — LOW (ref 37–47)
HGB BLD-MCNC: 10.9 G/DL — LOW (ref 12–16)
IMM GRANULOCYTES NFR BLD AUTO: 0 % — LOW (ref 0.1–0.3)
LYMPHOCYTES # BLD AUTO: 0.57 K/UL — LOW (ref 1.2–3.4)
LYMPHOCYTES # BLD AUTO: 22.3 % — SIGNIFICANT CHANGE UP (ref 20.5–51.1)
MAGNESIUM SERPL-MCNC: 2 MG/DL — SIGNIFICANT CHANGE UP (ref 1.8–2.4)
MCHC RBC-ENTMCNC: 28.5 PG — SIGNIFICANT CHANGE UP (ref 27–31)
MCHC RBC-ENTMCNC: 32.1 G/DL — SIGNIFICANT CHANGE UP (ref 32–37)
MCV RBC AUTO: 89 FL — SIGNIFICANT CHANGE UP (ref 81–99)
MONOCYTES # BLD AUTO: 0.38 K/UL — SIGNIFICANT CHANGE UP (ref 0.1–0.6)
MONOCYTES NFR BLD AUTO: 14.8 % — HIGH (ref 1.7–9.3)
NEUTROPHILS # BLD AUTO: 1.6 K/UL — SIGNIFICANT CHANGE UP (ref 1.4–6.5)
NEUTROPHILS NFR BLD AUTO: 62.5 % — SIGNIFICANT CHANGE UP (ref 42.2–75.2)
NRBC # BLD: 0 /100 WBCS — SIGNIFICANT CHANGE UP (ref 0–0)
PHOSPHATE SERPL-MCNC: 2.3 MG/DL — SIGNIFICANT CHANGE UP (ref 2.1–4.9)
PLATELET # BLD AUTO: 93 K/UL — LOW (ref 130–400)
PMV BLD: 12.1 FL — HIGH (ref 7.4–10.4)
POTASSIUM SERPL-MCNC: 4.3 MMOL/L — SIGNIFICANT CHANGE UP (ref 3.5–5)
POTASSIUM SERPL-SCNC: 4.3 MMOL/L — SIGNIFICANT CHANGE UP (ref 3.5–5)
PROT SERPL-MCNC: 5.4 G/DL — LOW (ref 6–8)
RBC # BLD: 3.82 M/UL — LOW (ref 4.2–5.4)
RBC # FLD: 18.1 % — HIGH (ref 11.5–14.5)
SODIUM SERPL-SCNC: 135 MMOL/L — SIGNIFICANT CHANGE UP (ref 135–146)
WBC # BLD: 2.56 K/UL — LOW (ref 4.8–10.8)
WBC # FLD AUTO: 2.56 K/UL — LOW (ref 4.8–10.8)

## 2024-07-01 PROCEDURE — S2900 ROBOTIC SURGICAL SYSTEM: CPT | Mod: NC

## 2024-07-01 PROCEDURE — 88304 TISSUE EXAM BY PATHOLOGIST: CPT | Mod: 26

## 2024-07-01 PROCEDURE — 47562 LAPAROSCOPIC CHOLECYSTECTOMY: CPT

## 2024-07-01 RX ORDER — PIPERACILLIN SODIUM AND TAZOBACTAM SODIUM 3; .375 G/15ML; G/15ML
3.38 INJECTION, POWDER, LYOPHILIZED, FOR SOLUTION INTRAVENOUS EVERY 8 HOURS
Refills: 0 | Status: DISCONTINUED | OUTPATIENT
Start: 2024-07-01 | End: 2024-07-02

## 2024-07-01 RX ORDER — PANTOPRAZOLE SODIUM 40 MG/10ML
40 INJECTION, POWDER, FOR SOLUTION INTRAVENOUS
Refills: 0 | Status: DISCONTINUED | OUTPATIENT
Start: 2024-07-01 | End: 2024-07-02

## 2024-07-01 RX ORDER — KETOROLAC TROMETHAMINE 30 MG/ML
15 INJECTION, SOLUTION INTRAMUSCULAR EVERY 6 HOURS
Refills: 0 | Status: DISCONTINUED | OUTPATIENT
Start: 2024-07-01 | End: 2024-07-02

## 2024-07-01 RX ORDER — SENNOSIDES 8.6 MG
2 TABLET ORAL AT BEDTIME
Refills: 0 | Status: DISCONTINUED | OUTPATIENT
Start: 2024-07-01 | End: 2024-07-02

## 2024-07-01 RX ORDER — ASPIRIN 325 MG/1
81 TABLET, FILM COATED ORAL DAILY
Refills: 0 | Status: DISCONTINUED | OUTPATIENT
Start: 2024-07-01 | End: 2024-07-01

## 2024-07-01 RX ORDER — LOSARTAN POTASSIUM 100 MG/1
25 TABLET, FILM COATED ORAL DAILY
Refills: 0 | Status: DISCONTINUED | OUTPATIENT
Start: 2024-07-01 | End: 2024-07-02

## 2024-07-01 RX ORDER — MONTELUKAST SODIUM 10 MG/1
10 TABLET, FILM COATED ORAL DAILY
Refills: 0 | Status: DISCONTINUED | OUTPATIENT
Start: 2024-07-01 | End: 2024-07-02

## 2024-07-01 RX ORDER — HYDROMORPHONE HCL 0.2 MG/ML
0.5 INJECTION, SOLUTION INTRAVENOUS
Refills: 0 | Status: DISCONTINUED | OUTPATIENT
Start: 2024-07-01 | End: 2024-07-01

## 2024-07-01 RX ORDER — ATORVASTATIN CALCIUM 20 MG/1
10 TABLET, FILM COATED ORAL AT BEDTIME
Refills: 0 | Status: DISCONTINUED | OUTPATIENT
Start: 2024-07-01 | End: 2024-07-02

## 2024-07-01 RX ORDER — INSULIN LISPRO 100 [IU]/ML
INJECTION, SOLUTION SUBCUTANEOUS
Refills: 0 | Status: DISCONTINUED | OUTPATIENT
Start: 2024-07-01 | End: 2024-07-02

## 2024-07-01 RX ORDER — ACETAMINOPHEN 325 MG
650 TABLET ORAL EVERY 6 HOURS
Refills: 0 | Status: DISCONTINUED | OUTPATIENT
Start: 2024-07-01 | End: 2024-07-02

## 2024-07-01 RX ORDER — HYDROMORPHONE HCL 0.2 MG/ML
1 INJECTION, SOLUTION INTRAVENOUS
Refills: 0 | Status: DISCONTINUED | OUTPATIENT
Start: 2024-07-01 | End: 2024-07-01

## 2024-07-01 RX ORDER — ONDANSETRON HYDROCHLORIDE 2 MG/ML
4 INJECTION INTRAMUSCULAR; INTRAVENOUS ONCE
Refills: 0 | Status: DISCONTINUED | OUTPATIENT
Start: 2024-07-01 | End: 2024-07-01

## 2024-07-01 RX ORDER — ACYCLOVIR SODIUM 50 MG/ML
400 VIAL (ML) INTRAVENOUS
Refills: 0 | Status: DISCONTINUED | OUTPATIENT
Start: 2024-07-01 | End: 2024-07-02

## 2024-07-01 RX ORDER — ENOXAPARIN SODIUM 100 MG/ML
40 INJECTION SUBCUTANEOUS EVERY 24 HOURS
Refills: 0 | Status: DISCONTINUED | OUTPATIENT
Start: 2024-07-01 | End: 2024-07-02

## 2024-07-01 RX ORDER — POLYETHYLENE GLYCOL 3350 1 G/G
17 POWDER ORAL DAILY
Refills: 0 | Status: DISCONTINUED | OUTPATIENT
Start: 2024-07-01 | End: 2024-07-02

## 2024-07-01 RX ADMIN — PIPERACILLIN SODIUM AND TAZOBACTAM SODIUM 25 GRAM(S): 3; .375 INJECTION, POWDER, LYOPHILIZED, FOR SOLUTION INTRAVENOUS at 21:30

## 2024-07-01 RX ADMIN — INSULIN LISPRO 2: 100 INJECTION, SOLUTION SUBCUTANEOUS at 17:46

## 2024-07-01 RX ADMIN — POTASSIUM PHOSPHATE, MONOBASIC POTASSIUM PHOSPHATE, DIBASIC INJECTION, 83.33 MILLIMOLE(S): 236; 224 SOLUTION, CONCENTRATE INTRAVENOUS at 00:52

## 2024-07-01 RX ADMIN — ATORVASTATIN CALCIUM 10 MILLIGRAM(S): 20 TABLET, FILM COATED ORAL at 17:47

## 2024-07-01 RX ADMIN — Medication 400 MILLIGRAM(S): at 05:49

## 2024-07-01 RX ADMIN — Medication 1 APPLICATION(S): at 05:48

## 2024-07-01 RX ADMIN — LOSARTAN POTASSIUM 25 MILLIGRAM(S): 100 TABLET, FILM COATED ORAL at 05:49

## 2024-07-01 RX ADMIN — PIPERACILLIN SODIUM AND TAZOBACTAM SODIUM 25 GRAM(S): 3; .375 INJECTION, POWDER, LYOPHILIZED, FOR SOLUTION INTRAVENOUS at 05:49

## 2024-07-01 RX ADMIN — DEXTROSE MONOHYDRATE AND SODIUM CHLORIDE 120 MILLILITER(S): 5; .3 INJECTION, SOLUTION INTRAVENOUS at 11:33

## 2024-07-01 RX ADMIN — MONTELUKAST SODIUM 10 MILLIGRAM(S): 10 TABLET, FILM COATED ORAL at 11:34

## 2024-07-01 RX ADMIN — Medication 400 MILLIGRAM(S): at 17:53

## 2024-07-01 RX ADMIN — DEXTROSE MONOHYDRATE AND SODIUM CHLORIDE 120 MILLILITER(S): 5; .3 INJECTION, SOLUTION INTRAVENOUS at 10:41

## 2024-07-01 RX ADMIN — ASPIRIN 81 MILLIGRAM(S): 325 TABLET, FILM COATED ORAL at 11:33

## 2024-07-01 RX ADMIN — PANTOPRAZOLE SODIUM 40 MILLIGRAM(S): 40 INJECTION, POWDER, FOR SOLUTION INTRAVENOUS at 05:49

## 2024-07-02 ENCOUNTER — TRANSCRIPTION ENCOUNTER (OUTPATIENT)
Age: 69
End: 2024-07-02

## 2024-07-02 VITALS
TEMPERATURE: 98 F | RESPIRATION RATE: 18 BRPM | SYSTOLIC BLOOD PRESSURE: 119 MMHG | HEART RATE: 66 BPM | OXYGEN SATURATION: 98 % | DIASTOLIC BLOOD PRESSURE: 64 MMHG

## 2024-07-02 LAB
GLUCOSE BLDC GLUCOMTR-MCNC: 136 MG/DL — HIGH (ref 70–99)
GLUCOSE BLDC GLUCOMTR-MCNC: 202 MG/DL — HIGH (ref 70–99)

## 2024-07-02 PROCEDURE — 99232 SBSQ HOSP IP/OBS MODERATE 35: CPT | Mod: 24

## 2024-07-02 RX ORDER — ATORVASTATIN CALCIUM 80 MG/1
0 TABLET, FILM COATED ORAL
Qty: 0 | Refills: 0 | DISCHARGE

## 2024-07-02 RX ORDER — OXYCODONE HYDROCHLORIDE 100 MG/5ML
1 SOLUTION ORAL
Qty: 5 | Refills: 0
Start: 2024-07-02

## 2024-07-02 RX ORDER — ACETAMINOPHEN 325 MG
2 TABLET ORAL
Qty: 0 | Refills: 0 | DISCHARGE
Start: 2024-07-02

## 2024-07-02 RX ADMIN — PANTOPRAZOLE SODIUM 40 MILLIGRAM(S): 40 INJECTION, POWDER, FOR SOLUTION INTRAVENOUS at 05:46

## 2024-07-02 RX ADMIN — Medication 1 APPLICATION(S): at 05:50

## 2024-07-02 RX ADMIN — MONTELUKAST SODIUM 10 MILLIGRAM(S): 10 TABLET, FILM COATED ORAL at 12:03

## 2024-07-02 RX ADMIN — INSULIN LISPRO 4: 100 INJECTION, SOLUTION SUBCUTANEOUS at 12:02

## 2024-07-02 RX ADMIN — Medication 400 MILLIGRAM(S): at 05:46

## 2024-07-02 RX ADMIN — PIPERACILLIN SODIUM AND TAZOBACTAM SODIUM 25 GRAM(S): 3; .375 INJECTION, POWDER, LYOPHILIZED, FOR SOLUTION INTRAVENOUS at 05:45

## 2024-07-02 RX ADMIN — LOSARTAN POTASSIUM 25 MILLIGRAM(S): 100 TABLET, FILM COATED ORAL at 05:46

## 2024-07-03 PROBLEM — Z00.00 ENCOUNTER FOR PREVENTIVE HEALTH EXAMINATION: Status: ACTIVE | Noted: 2024-07-03

## 2024-07-04 LAB
CULTURE RESULTS: SIGNIFICANT CHANGE UP
CULTURE RESULTS: SIGNIFICANT CHANGE UP
SPECIMEN SOURCE: SIGNIFICANT CHANGE UP
SPECIMEN SOURCE: SIGNIFICANT CHANGE UP

## 2024-07-08 ENCOUNTER — INPATIENT (INPATIENT)
Facility: HOSPITAL | Age: 69
LOS: 2 days | Discharge: HOME | DRG: 440 | End: 2024-07-11
Attending: SURGERY | Admitting: SURGERY
Payer: MEDICARE

## 2024-07-08 VITALS
HEIGHT: 64 IN | TEMPERATURE: 100 F | DIASTOLIC BLOOD PRESSURE: 84 MMHG | HEART RATE: 84 BPM | WEIGHT: 186.07 LBS | RESPIRATION RATE: 18 BRPM | SYSTOLIC BLOOD PRESSURE: 133 MMHG | OXYGEN SATURATION: 98 %

## 2024-07-08 DIAGNOSIS — Z90.89 ACQUIRED ABSENCE OF OTHER ORGANS: Chronic | ICD-10-CM

## 2024-07-08 DIAGNOSIS — K85.90 ACUTE PANCREATITIS WITHOUT NECROSIS OR INFECTION, UNSPECIFIED: ICD-10-CM

## 2024-07-08 DIAGNOSIS — Z90.12 ACQUIRED ABSENCE OF LEFT BREAST AND NIPPLE: Chronic | ICD-10-CM

## 2024-07-08 LAB
ALBUMIN SERPL ELPH-MCNC: 4 G/DL — SIGNIFICANT CHANGE UP (ref 3.5–5.2)
ALP SERPL-CCNC: 416 U/L — HIGH (ref 30–115)
ALT FLD-CCNC: 261 U/L — HIGH (ref 0–41)
ANION GAP SERPL CALC-SCNC: 13 MMOL/L — SIGNIFICANT CHANGE UP (ref 7–14)
APPEARANCE UR: CLEAR — SIGNIFICANT CHANGE UP
AST SERPL-CCNC: 298 U/L — HIGH (ref 0–41)
BASOPHILS # BLD AUTO: 0.02 K/UL — SIGNIFICANT CHANGE UP (ref 0–0.2)
BASOPHILS NFR BLD AUTO: 0.5 % — SIGNIFICANT CHANGE UP (ref 0–1)
BILIRUB DIRECT SERPL-MCNC: 2.4 MG/DL — HIGH (ref 0–0.3)
BILIRUB INDIRECT FLD-MCNC: 1.7 MG/DL — HIGH (ref 0.2–1.2)
BILIRUB SERPL-MCNC: 4.1 MG/DL — HIGH (ref 0.2–1.2)
BILIRUB UR-MCNC: ABNORMAL
BUN SERPL-MCNC: 8 MG/DL — LOW (ref 10–20)
CALCIUM SERPL-MCNC: 9.4 MG/DL — SIGNIFICANT CHANGE UP (ref 8.4–10.4)
CHLORIDE SERPL-SCNC: 102 MMOL/L — SIGNIFICANT CHANGE UP (ref 98–110)
CO2 SERPL-SCNC: 24 MMOL/L — SIGNIFICANT CHANGE UP (ref 17–32)
COLOR SPEC: SIGNIFICANT CHANGE UP
CREAT SERPL-MCNC: 0.5 MG/DL — LOW (ref 0.7–1.5)
DIFF PNL FLD: NEGATIVE — SIGNIFICANT CHANGE UP
EGFR: 101 ML/MIN/1.73M2 — SIGNIFICANT CHANGE UP
EOSINOPHIL # BLD AUTO: 0 K/UL — SIGNIFICANT CHANGE UP (ref 0–0.7)
EOSINOPHIL NFR BLD AUTO: 0 % — SIGNIFICANT CHANGE UP (ref 0–8)
GLUCOSE SERPL-MCNC: 204 MG/DL — HIGH (ref 70–99)
GLUCOSE UR QL: >=1000 MG/DL
HCT VFR BLD CALC: 36.4 % — LOW (ref 37–47)
HGB BLD-MCNC: 12.1 G/DL — SIGNIFICANT CHANGE UP (ref 12–16)
IMM GRANULOCYTES NFR BLD AUTO: 0.3 % — SIGNIFICANT CHANGE UP (ref 0.1–0.3)
KETONES UR-MCNC: ABNORMAL MG/DL
LACTATE SERPL-SCNC: 1.2 MMOL/L — SIGNIFICANT CHANGE UP (ref 0.7–2)
LEUKOCYTE ESTERASE UR-ACNC: NEGATIVE — SIGNIFICANT CHANGE UP
LIDOCAIN IGE QN: >3000 U/L — HIGH (ref 7–60)
LYMPHOCYTES # BLD AUTO: 0.74 K/UL — LOW (ref 1.2–3.4)
LYMPHOCYTES # BLD AUTO: 20.3 % — LOW (ref 20.5–51.1)
MCHC RBC-ENTMCNC: 28.8 PG — SIGNIFICANT CHANGE UP (ref 27–31)
MCHC RBC-ENTMCNC: 33.2 G/DL — SIGNIFICANT CHANGE UP (ref 32–37)
MCV RBC AUTO: 86.7 FL — SIGNIFICANT CHANGE UP (ref 81–99)
MONOCYTES # BLD AUTO: 0.28 K/UL — SIGNIFICANT CHANGE UP (ref 0.1–0.6)
MONOCYTES NFR BLD AUTO: 7.7 % — SIGNIFICANT CHANGE UP (ref 1.7–9.3)
NEUTROPHILS # BLD AUTO: 2.59 K/UL — SIGNIFICANT CHANGE UP (ref 1.4–6.5)
NEUTROPHILS NFR BLD AUTO: 71.2 % — SIGNIFICANT CHANGE UP (ref 42.2–75.2)
NITRITE UR-MCNC: NEGATIVE — SIGNIFICANT CHANGE UP
NRBC # BLD: 0 /100 WBCS — SIGNIFICANT CHANGE UP (ref 0–0)
NT-PROBNP SERPL-SCNC: 119 PG/ML — SIGNIFICANT CHANGE UP (ref 0–300)
PH UR: 5.5 — SIGNIFICANT CHANGE UP (ref 5–8)
PLATELET # BLD AUTO: 178 K/UL — SIGNIFICANT CHANGE UP (ref 130–400)
PMV BLD: 9.9 FL — SIGNIFICANT CHANGE UP (ref 7.4–10.4)
POTASSIUM SERPL-MCNC: 4.4 MMOL/L — SIGNIFICANT CHANGE UP (ref 3.5–5)
POTASSIUM SERPL-SCNC: 4.4 MMOL/L — SIGNIFICANT CHANGE UP (ref 3.5–5)
PROT SERPL-MCNC: 5.8 G/DL — LOW (ref 6–8)
PROT UR-MCNC: SIGNIFICANT CHANGE UP MG/DL
RBC # BLD: 4.2 M/UL — SIGNIFICANT CHANGE UP (ref 4.2–5.4)
RBC # FLD: 17.5 % — HIGH (ref 11.5–14.5)
SODIUM SERPL-SCNC: 139 MMOL/L — SIGNIFICANT CHANGE UP (ref 135–146)
SP GR SPEC: 1.02 — SIGNIFICANT CHANGE UP (ref 1–1.03)
TROPONIN T, HIGH SENSITIVITY RESULT: <6 NG/L — SIGNIFICANT CHANGE UP (ref 6–13)
UROBILINOGEN FLD QL: 1 MG/DL — SIGNIFICANT CHANGE UP (ref 0.2–1)
WBC # BLD: 3.64 K/UL — LOW (ref 4.8–10.8)
WBC # FLD AUTO: 3.64 K/UL — LOW (ref 4.8–10.8)

## 2024-07-08 PROCEDURE — C1769: CPT

## 2024-07-08 PROCEDURE — 36415 COLL VENOUS BLD VENIPUNCTURE: CPT

## 2024-07-08 PROCEDURE — 80076 HEPATIC FUNCTION PANEL: CPT

## 2024-07-08 PROCEDURE — C1889: CPT

## 2024-07-08 PROCEDURE — 93010 ELECTROCARDIOGRAM REPORT: CPT

## 2024-07-08 PROCEDURE — C9399: CPT

## 2024-07-08 PROCEDURE — 84100 ASSAY OF PHOSPHORUS: CPT

## 2024-07-08 PROCEDURE — 71045 X-RAY EXAM CHEST 1 VIEW: CPT | Mod: 26

## 2024-07-08 PROCEDURE — 93970 EXTREMITY STUDY: CPT | Mod: 26

## 2024-07-08 PROCEDURE — 85027 COMPLETE CBC AUTOMATED: CPT

## 2024-07-08 PROCEDURE — 74177 CT ABD & PELVIS W/CONTRAST: CPT | Mod: 26,MC

## 2024-07-08 PROCEDURE — 74018 RADEX ABDOMEN 1 VIEW: CPT

## 2024-07-08 PROCEDURE — 83735 ASSAY OF MAGNESIUM: CPT

## 2024-07-08 PROCEDURE — 76705 ECHO EXAM OF ABDOMEN: CPT

## 2024-07-08 PROCEDURE — 82962 GLUCOSE BLOOD TEST: CPT

## 2024-07-08 PROCEDURE — 99285 EMERGENCY DEPT VISIT HI MDM: CPT

## 2024-07-08 PROCEDURE — 99223 1ST HOSP IP/OBS HIGH 75: CPT | Mod: 24

## 2024-07-08 PROCEDURE — 99024 POSTOP FOLLOW-UP VISIT: CPT

## 2024-07-08 PROCEDURE — 85025 COMPLETE CBC W/AUTO DIFF WBC: CPT

## 2024-07-08 PROCEDURE — 80048 BASIC METABOLIC PNL TOTAL CA: CPT

## 2024-07-08 RX ORDER — ACYCLOVIR SODIUM 50 MG/ML
400 VIAL (ML) INTRAVENOUS
Refills: 0 | Status: DISCONTINUED | OUTPATIENT
Start: 2024-07-08 | End: 2024-07-11

## 2024-07-08 RX ORDER — ATORVASTATIN CALCIUM 20 MG/1
10 TABLET, FILM COATED ORAL AT BEDTIME
Refills: 0 | Status: DISCONTINUED | OUTPATIENT
Start: 2024-07-08 | End: 2024-07-11

## 2024-07-08 RX ORDER — ENOXAPARIN SODIUM 100 MG/ML
40 INJECTION SUBCUTANEOUS EVERY 24 HOURS
Refills: 0 | Status: DISCONTINUED | OUTPATIENT
Start: 2024-07-08 | End: 2024-07-11

## 2024-07-08 RX ORDER — LOSARTAN POTASSIUM 100 MG/1
25 TABLET, FILM COATED ORAL DAILY
Refills: 0 | Status: DISCONTINUED | OUTPATIENT
Start: 2024-07-08 | End: 2024-07-11

## 2024-07-08 RX ORDER — DEXTROSE 30 % IN WATER 30 %
25 VIAL (ML) INTRAVENOUS ONCE
Refills: 0 | Status: DISCONTINUED | OUTPATIENT
Start: 2024-07-08 | End: 2024-07-11

## 2024-07-08 RX ORDER — ONDANSETRON HYDROCHLORIDE 2 MG/ML
4 INJECTION INTRAMUSCULAR; INTRAVENOUS ONCE
Refills: 0 | Status: COMPLETED | OUTPATIENT
Start: 2024-07-08 | End: 2024-07-08

## 2024-07-08 RX ORDER — PIPERACILLIN SODIUM AND TAZOBACTAM SODIUM 3; .375 G/15ML; G/15ML
3.38 INJECTION, POWDER, LYOPHILIZED, FOR SOLUTION INTRAVENOUS ONCE
Refills: 0 | Status: COMPLETED | OUTPATIENT
Start: 2024-07-08 | End: 2024-07-08

## 2024-07-08 RX ORDER — FAMOTIDINE 40 MG
20 TABLET ORAL ONCE
Refills: 0 | Status: COMPLETED | OUTPATIENT
Start: 2024-07-08 | End: 2024-07-08

## 2024-07-08 RX ORDER — ACETAMINOPHEN 325 MG
650 TABLET ORAL EVERY 6 HOURS
Refills: 0 | Status: DISCONTINUED | OUTPATIENT
Start: 2024-07-08 | End: 2024-07-11

## 2024-07-08 RX ORDER — KETOROLAC TROMETHAMINE 30 MG/ML
15 INJECTION, SOLUTION INTRAMUSCULAR EVERY 6 HOURS
Refills: 0 | Status: DISCONTINUED | OUTPATIENT
Start: 2024-07-08 | End: 2024-07-11

## 2024-07-08 RX ORDER — MONTELUKAST SODIUM 10 MG/1
10 TABLET, FILM COATED ORAL DAILY
Refills: 0 | Status: DISCONTINUED | OUTPATIENT
Start: 2024-07-08 | End: 2024-07-11

## 2024-07-08 RX ORDER — SODIUM CHLORIDE 0.9 % (FLUSH) 0.9 %
1000 SYRINGE (ML) INJECTION ONCE
Refills: 0 | Status: COMPLETED | OUTPATIENT
Start: 2024-07-08 | End: 2024-07-08

## 2024-07-08 RX ORDER — INSULIN LISPRO 100 [IU]/ML
INJECTION, SOLUTION SUBCUTANEOUS EVERY 8 HOURS
Refills: 0 | Status: DISCONTINUED | OUTPATIENT
Start: 2024-07-08 | End: 2024-07-11

## 2024-07-08 RX ORDER — DEXTROSE MONOHYDRATE AND SODIUM CHLORIDE 5; .3 G/100ML; G/100ML
1000 INJECTION, SOLUTION INTRAVENOUS
Refills: 0 | Status: DISCONTINUED | OUTPATIENT
Start: 2024-07-08 | End: 2024-07-11

## 2024-07-08 RX ORDER — ASPIRIN 325 MG/1
81 TABLET, FILM COATED ORAL DAILY
Refills: 0 | Status: DISCONTINUED | OUTPATIENT
Start: 2024-07-08 | End: 2024-07-11

## 2024-07-08 RX ADMIN — Medication 20 MILLIGRAM(S): at 17:56

## 2024-07-08 RX ADMIN — DEXTROSE MONOHYDRATE AND SODIUM CHLORIDE 125 MILLILITER(S): 5; .3 INJECTION, SOLUTION INTRAVENOUS at 21:35

## 2024-07-08 RX ADMIN — ATORVASTATIN CALCIUM 10 MILLIGRAM(S): 20 TABLET, FILM COATED ORAL at 22:20

## 2024-07-08 RX ADMIN — ONDANSETRON HYDROCHLORIDE 4 MILLIGRAM(S): 2 INJECTION INTRAMUSCULAR; INTRAVENOUS at 17:56

## 2024-07-08 RX ADMIN — Medication 1000 MILLILITER(S): at 17:56

## 2024-07-08 RX ADMIN — PIPERACILLIN SODIUM AND TAZOBACTAM SODIUM 200 GRAM(S): 3; .375 INJECTION, POWDER, LYOPHILIZED, FOR SOLUTION INTRAVENOUS at 20:26

## 2024-07-08 RX ADMIN — ENOXAPARIN SODIUM 40 MILLIGRAM(S): 100 INJECTION SUBCUTANEOUS at 22:20

## 2024-07-08 NOTE — H&P ADULT - NSHPLABSRESULTS_GEN_ALL_CORE
CBC Full  -  ( 2024 18:10 )                        12.1   3.64  )-----------( 178      ( 2024 18:10 )             36.4           139  |  102  |  8<L>  ----------------------------<  204<H>  4.4   |  24  |  0.5<L>    Ca    9.4      2024 18:10    TPro  5.8<L>  /  Alb  4.0  /  TBili  4.1<H>  /  DBili  2.4<H>  /  AST  298<H>  /  ALT  261<H>  /  AlkPhos  416<H>      LIVER FUNCTIONS - ( 2024 18:10 )  Alb: 4.0 g/dL / Pro: 5.8 g/dL / ALK PHOS: 416 U/L / ALT: 261 U/L / AST: 298 U/L / GGT: x           CAPILLARY BLOOD GLUCOSE        Urinalysis Basic - ( 2024 18:20 )    Color: Dark Yellow / Appearance: Clear / S.016 / pH: x  Gluc: x / Ketone: Trace mg/dL  / Bili: Small / Urobili: 1.0 mg/dL   Blood: x / Protein: Trace mg/dL / Nitrite: Negative   Leuk Esterase: Negative / RBC: x / WBC x   Sq Epi: x / Non Sq Epi: x / Bacteria: x      < from: CT Abdomen and Pelvis w/ IV Cont (24 @ 19:43) >    IMPRESSION:    Small collection in the gallbladder fossa with additional perihepatic   fluid. Differential includes biloma. Consider evaluation with right upper   quadrant ultrasound and HIDA scan as clinically indicated.    Stable findings associated with cirrhosis with sequelae of portal   hypertension including splenomegaly and dilated splenic and portal veins.    < end of copied text >

## 2024-07-08 NOTE — ED PROVIDER NOTE - CARE PLAN
Principal Discharge DX:	Pancreatitis  Secondary Diagnosis:	Transaminitis  Secondary Diagnosis:	Gallstones   1

## 2024-07-08 NOTE — ED PROVIDER NOTE - PHYSICAL EXAMINATION
CONSTITUTIONAL: Well-developed; well-nourished; in no acute distress.   SKIN: warm, dry  HEAD: Normocephalic; atraumatic.  EYES: PERRL, EOMI, no conjunctival erythema  ENT: No nasal discharge; airway clear.  NECK: Supple; non tender.  CARD: S1, S2 normal; no murmurs, gallops, or rubs. Regular rate and rhythm.   RESP: No wheezes, rales or rhonchi.  ABD: soft mildly distended, tenderness to the upper quadrants. bilateral cva tenderness.   EXT: Normal ROM. 1+ pitting edema to the ankles, symmetric.   NEURO: Alert, oriented, grossly unremarkable  PSYCH: Cooperative, appropriate.

## 2024-07-08 NOTE — H&P ADULT - ATTENDING COMMENTS
Trauma/Acute Care Surgery Attending Note Attestation  Patient was seen and examined bedside.  I reviewed the resident/PA note and agreed with above assessment and plan with following additions and corrections.    History as above. Patient reports her pain is similar to how she first presented.     T(C): 37.5 (07-08-24 @ 16:50), Max: 37.5 (07-08-24 @ 16:50)  HR: 84 (07-08-24 @ 16:50) (84 - 84)  BP: 133/84 (07-08-24 @ 16:50) (133/84 - 133/84)  RR: 18 (07-08-24 @ 16:50) (18 - 18)  SpO2: 98% (07-08-24 @ 16:50) (98% - 98%)    I independently performed a medically appropriate exam. The exam was notable for mild epigastric tenderness. Port sites healing well.                          12.1   3.64  )-----------( 178      ( 08 Jul 2024 18:10 )             36.4     07-08    139  |  102  |  8<L>  ----------------------------<  204<H>  4.4   |  24  |  0.5<L>    Ca 9.4; Mg x ; Phos x       ( 08 Jul 2024 18:10 )  Alb: 4.0 g/dL / Pro: 5.8 g/dL / AlkPhos: 416 U/L / ALT: 261 U/L / AST: 298 U/L / GGT:x     / Tbili 4.1 mg/dL/ Dbili 2.4 mg/dL / Indbili 1.7 mg/dL      Lactate, Blood: 1.2 mmol/L (07-08 @ 18:10)    CT A/P reviewed and interpreted by me: small amount of perihepatic fluid, collection in gallbladder fossa (likely vistaseal hemostatic agent)    Assessment/Plan:  69y Female PMH as above with recent cholecystectomy for gallstone pancreatitis here with recurrent pancreatitis and concern for choledocholithiasis likely from retained sludge or stones. Tbili is markedly elevated. Admit to surgery. Will plan for RUQ ultrasound and advanced GI consult. IV fluid for resuscitation. Insulin sliding scale while inpatient for diabetes. Monitor blood pressure and restart home meds as appropriate.    Cyril J Lo, MD  Trauma/Acute Care Surgery/Surgical Critical Care Attending Trauma/Acute Care Surgery Attending Note Attestation  Patient was seen and examined bedside.  I reviewed the resident/PA note and agreed with above assessment and plan with following additions and corrections.    History as above. Patient reports her pain is similar to how she first presented.     T(C): 37.5 (07-08-24 @ 16:50), Max: 37.5 (07-08-24 @ 16:50)  HR: 84 (07-08-24 @ 16:50) (84 - 84)  BP: 133/84 (07-08-24 @ 16:50) (133/84 - 133/84)  RR: 18 (07-08-24 @ 16:50) (18 - 18)  SpO2: 98% (07-08-24 @ 16:50) (98% - 98%)    I independently performed a medically appropriate exam. The exam was notable for mild epigastric tenderness. Port sites healing well.                          12.1   3.64  )-----------( 178      ( 08 Jul 2024 18:10 )             36.4     07-08    139  |  102  |  8<L>  ----------------------------<  204<H>  4.4   |  24  |  0.5<L>    Ca 9.4; Mg x ; Phos x       ( 08 Jul 2024 18:10 )  Alb: 4.0 g/dL / Pro: 5.8 g/dL / AlkPhos: 416 U/L / ALT: 261 U/L / AST: 298 U/L / GGT:x     / Tbili 4.1 mg/dL/ Dbili 2.4 mg/dL / Indbili 1.7 mg/dL    Lipase > 3000  Lactate, Blood: 1.2 mmol/L (07-08 @ 18:10)    CT A/P reviewed and interpreted by me: small amount of perihepatic fluid, collection in gallbladder fossa (likely vistaseal hemostatic agent)    Assessment/Plan:  69y Female PMH as above with recent cholecystectomy for gallstone pancreatitis here with recurrent pancreatitis and concern for choledocholithiasis likely from retained sludge or stones. Tbili is markedly elevated. Admit to surgery. Will plan for RUQ ultrasound and advanced GI consult. IV fluid for resuscitation. Insulin sliding scale while inpatient for diabetes. Monitor blood pressure and restart home meds as appropriate. Start Zosyn for antibiotics as patient is immunosuppressed for multiple myeloma treatment.    Cyril Lo MD  Trauma/Acute Care Surgery/Surgical Critical Care Attending

## 2024-07-08 NOTE — H&P ADULT - NSHPPHYSICALEXAM_GEN_ALL_CORE
T(C): 37.5 (07-08-24 @ 16:50), Max: 37.5 (07-08-24 @ 16:50)  HR: 84 (07-08-24 @ 16:50) (84 - 84)  BP: 133/84 (07-08-24 @ 16:50) (133/84 - 133/84)  RR: 18 (07-08-24 @ 16:50) (18 - 18)  SpO2: 98% (07-08-24 @ 16:50) (98% - 98%)    CONSTITUTIONAL: Well groomed, no apparent distress  EYES: PERRLA and symmetric, EOMI, No conjunctival or scleral injection, non-icteric  RESP: No respiratory distress, no use of accessory muscles  CV: RRR  GI: Soft, mildly distended, mildly tender to palpation in epigastric region without rebound or guarding  MSK: No clubbing or cyanosis  SKIN: Warm, dry, no rashes or ulcers noted  INCISIONS: stapled, clean, dry, intact  PSYCH: Appropriate insight/judgment; A+O x 3, mood and affect appropriate
1 person assist/set-up required/verbal cues

## 2024-07-08 NOTE — H&P ADULT - HISTORY OF PRESENT ILLNESS
69F with PMH left breast cancer s/p left breast mastectomy, open appendectomy,  x2, HTN, HLD, DM2, multiple myeloma on chemotherapy for last 2 months (4 months total)- last chemo 2 weeks prior, now 1 week post-op s/p cholecystectomy for cholecystitis and gallstone pancreatitis presents with epigastric abdominal pain, abdominal distention, nausea and multiple episodes of NBNB vomiting x 2 days. Patient states that she had acute onset of epigastric abdominal pain last night with associated chills, nausea and vomiting x4 which began this morning. She has been unable to tolerate PO intake. Last bowel movement was this morning.

## 2024-07-08 NOTE — ED ADULT TRIAGE NOTE - CHIEF COMPLAINT QUOTE
Pt had gallbladder removed last week and now having pain with nausea and vomiting. Was told to come in by Dr. Lo

## 2024-07-08 NOTE — ED PROVIDER NOTE - CLINICAL SUMMARY MEDICAL DECISION MAKING FREE TEXT BOX
69F pmh of left breast cancer s/p left breast mastectomy, open appendectomy,  x2, htn, hld, DM2, multiple myeloma on chemotherapy for last 2 months (4 months total) s/p cholecystectomy 1 week ago p/w  epigastric abd pain, distention, multiple episodes of NBNB vomiting x 2 days. denies fevers. endorses lower extremity edema to bilateral ankles. denies chest pain or sob. 69F pmh of left breast cancer s/p left breast mastectomy, open appendectomy,  x2, htn, hld, DM2, multiple myeloma on chemotherapy for last 2 months (4 months total) s/p cholecystectomy 1 week ago p/w  epigastric abd pain, distention, multiple episodes of NBNB vomiting x 2 days. denies fevers. endorses lower extremity edema to bilateral ankles. denies chest pain or sob. labs with transaminitis, increased lipase. CT ordered- surgery evaluated patient, will be admitted to their service for further management. will give empiric abx.

## 2024-07-08 NOTE — ED PROVIDER NOTE - CHILD ABUSE FACILITY
"              After Visit Summary   4/16/2018    Alberto Dorsey    MRN: [id number]           Patient Information     Date Of Birth          1947        Visit Information        Provider Department      4/16/2018 1:30 PM Manuel Mondragon MD [facility]        Care Instructions    [facility]    1) Please follow up in a year for an Ultrasound/TONYA of your legs and clinic visit with Dr Mondragon.     2) You will get a call or letter from the clinic to help you schedule this appointment.     Please call Eusebia RN Dr Mondragon's nurse with any concerns or questions at           Follow-ups after your visit        Who to contact     If you have questions or need follow up information about today's clinic visit or your schedule please contact [facility] [place] directly at [phone].  Normal or non-critical lab and imaging results will be communicated to you by MyChart, letter or phone within 4 business days after the clinic has received the results. If you do not hear from us within 7 days, please contact the clinic through MyChart or phone. If you have a critical or abnormal lab result, we will notify you by phone as soon as possible.  Submit refill requests through [employer] or call your pharmacy and they will forward the refill request to us. Please allow 3 business days for your refill to be completed.          Additional Information About Your Visit        MyChart Information     [employer] lets you send messages to your doctor, view your test results, renew your prescriptions, schedule appointments and more. To sign up, go to [email or web address] . Click on \"Log in\" on the left side of the screen, which will take you to the Welcome page. Then click on \"Sign up Now\" on the right side of the page.     You will be asked to enter the access code listed below, as well as some personal information. Please follow the " directions to create your username and password.     Your access code is: [email or web address]  Expires: 7/15/2018  1:44 PM     Your access code will  in 90 days. If you need help or a new code, please call your [place] clinic or [phone].        Care EveryWhere ID     This is your Care EveryWhere ID. This could be used by other organizations to access your [place] medical records  [id number]        Your Vitals Were     Pulse                   80            Blood Pressure from Last 3 Encounters:   18 136/68   17 150/83   10/28/15 140/80    Weight from Last 3 Encounters:   16 77.1 kg (170 lb)   10/28/15 85.2 kg (187 lb 12.8 oz)              Today, you had the following     No orders found for display       Primary Care Provider Office Phone # Fax #    Nancy Shipley [phone]       [facility] [address]  [place] [address]        Equal Access to Services     [facility] AH: Hadii aad ku hadasho Soomaali, waaxda luqadaha, qaybta kaalmada adeegyada, waxay idiin hayaan grant kharajignesh esposito . So [facility] [phone].    ATENCIÓN: Si habla español, tiene a blount disposición servicios gratuitos de asistencia lingüística. [facility] [phone].    We comply with applicable federal civil rights laws and [place] laws. We do not discriminate on the basis of race, color, national origin, age, disability, sex, sexual orientation, or gender identity.            Thank you!     Thank you for choosing [facility] [place]  for your care. Our goal is always to provide you with excellent care. Hearing back from our patients is one way we can continue to improve our services. Please take a few minutes to complete the written survey that you may receive in the mail after your visit with us. Thank you!             Your Updated Medication List - Protect others around you: Learn how to safely use, store and throw away your medicines at www.disposemymeds.org.          This list is  accurate as of 4/16/18  1:44 PM.  Always use your most recent med list.                   Brand Name Dispense Instructions for use Diagnosis    AMLODIPINE BESYLATE PO      Take 10 mg by mouth daily        aspirin 81 MG tablet      Take 81 mg by mouth daily        HYDRALAZINE HCL PO      Take 25 mg by mouth 2 times daily        lisinopril-hydrochlorothiazide 20-25 MG per tablet    PRINZIDE/ZESTORETIC     Take 1 tablet by mouth daily        triamcinolone 0.1 % cream    KENALOG     Apply topically 2 times daily           [facility]

## 2024-07-08 NOTE — ED ADULT NURSE NOTE - NSFALLUNIVINTERV_ED_ALL_ED
Assistance OOB with selected safe patient handling equipment if applicable/Communicate risk of Fall with Harm to all staff, patient, and family/Encourage patient to sit up slowly, dangle for a short time, stand at bedside before walking/Monitor gait and stability/Monitor for mental status changes and reorient to person, place, and time, as needed/Review medications for side effects contributing to fall risk/Use of alarms - bed, stretcher, chair and/or video monitoring/Bed/Stretcher in lowest position, wheels locked, appropriate side rails in place/Call bell, personal items and telephone in reach/Instruct patient to call for assistance before getting out of bed/chair/stretcher/Non-slip footwear applied when patient is off stretcher/[place] to call system/Physically safe environment - no spills, clutter or unnecessary equipment/Purposeful proactive rounding/Room/bathroom lighting operational, light cord in reach

## 2024-07-08 NOTE — H&P ADULT - ASSESSMENT
ASSESSMENT:  69F with PMH left breast cancer s/p left breast mastectomy, open appendectomy,  x2, HTN, HLD, DM2, multiple myeloma on chemotherapy for last 2 months (4 months total)- last chemo 2 weeks prior, now 1 week post-op s/p cholecystectomy for cholecystitis and gallstone pancreatitis presents with epigastric abdominal pain, abdominal distention, nausea and multiple episodes of NBNB vomiting x 2 days. Patient states that she had acute onset of epigastric abdominal pain last night with associated chills, nausea and vomiting x4 which began this morning. She has been unable to tolerate PO intake. Last bowel movement was this morning. Physical exam findings, imaging, and labs as documented above.     PLAN:  #Gallstone pancreatitis  - Admit to surgery under Dr. Lo  - NPO, IVF  - Advanced GI consult for ERCP   - DVT ppx  - Hemodynamic monitoring       Above plan discussed with Attending Surgeon Dr. Lo, patient, patient family.

## 2024-07-08 NOTE — ED ADULT NURSE NOTE - OBJECTIVE STATEMENT
Pt. reports pain and discomfort in abd. Pt. d/c 1 week ago s/p gallbladder surgery, denies fevers, reports vomiting

## 2024-07-08 NOTE — ED PROVIDER NOTE - OBJECTIVE STATEMENT
69F pmh of left breast cancer s/p left breast mastectomy, open appendectomy,  x2, htn, hld, DM2, multiple myeloma on chemotherapy for last 2 months (4 months total)- last chemo 2 weeks prior s/p cholecystectomy for cholecystitis and pancreatitis,1 week ago p/w  epigastric abd pain, distention, multiple episodes of NBNB vomiting x 2 days. denies fevers. endorses lower extremity edema to bilateral ankles. denies chest pain or sob. last bowel movement was this morning, no melena, no BRBPR. denies dysuria.

## 2024-07-09 LAB
ALBUMIN SERPL ELPH-MCNC: 3.1 G/DL — LOW (ref 3.5–5.2)
ALBUMIN SERPL ELPH-MCNC: 3.4 G/DL — LOW (ref 3.5–5.2)
ALP SERPL-CCNC: 332 U/L — HIGH (ref 30–115)
ALP SERPL-CCNC: 379 U/L — HIGH (ref 30–115)
ALT FLD-CCNC: 179 U/L — HIGH (ref 0–41)
ALT FLD-CCNC: 210 U/L — HIGH (ref 0–41)
ANION GAP SERPL CALC-SCNC: 11 MMOL/L — SIGNIFICANT CHANGE UP (ref 7–14)
ANION GAP SERPL CALC-SCNC: 11 MMOL/L — SIGNIFICANT CHANGE UP (ref 7–14)
AST SERPL-CCNC: 128 U/L — HIGH (ref 0–41)
AST SERPL-CCNC: 176 U/L — HIGH (ref 0–41)
BILIRUB DIRECT SERPL-MCNC: 0.9 MG/DL — HIGH (ref 0–0.3)
BILIRUB DIRECT SERPL-MCNC: 1.5 MG/DL — HIGH (ref 0–0.3)
BILIRUB INDIRECT FLD-MCNC: 1.7 MG/DL — HIGH (ref 0.2–1.2)
BILIRUB INDIRECT FLD-MCNC: 1.8 MG/DL — HIGH (ref 0.2–1.2)
BILIRUB SERPL-MCNC: 2.7 MG/DL — HIGH (ref 0.2–1.2)
BILIRUB SERPL-MCNC: 3.2 MG/DL — HIGH (ref 0.2–1.2)
BUN SERPL-MCNC: 7 MG/DL — LOW (ref 10–20)
BUN SERPL-MCNC: 7 MG/DL — LOW (ref 10–20)
CALCIUM SERPL-MCNC: 8.1 MG/DL — LOW (ref 8.4–10.5)
CALCIUM SERPL-MCNC: 8.3 MG/DL — LOW (ref 8.4–10.5)
CHLORIDE SERPL-SCNC: 103 MMOL/L — SIGNIFICANT CHANGE UP (ref 98–110)
CHLORIDE SERPL-SCNC: 104 MMOL/L — SIGNIFICANT CHANGE UP (ref 98–110)
CO2 SERPL-SCNC: 22 MMOL/L — SIGNIFICANT CHANGE UP (ref 17–32)
CO2 SERPL-SCNC: 24 MMOL/L — SIGNIFICANT CHANGE UP (ref 17–32)
CREAT SERPL-MCNC: <0.5 MG/DL — LOW (ref 0.7–1.5)
CREAT SERPL-MCNC: <0.5 MG/DL — LOW (ref 0.7–1.5)
EGFR: 107 ML/MIN/1.73M2 — SIGNIFICANT CHANGE UP
EGFR: 107 ML/MIN/1.73M2 — SIGNIFICANT CHANGE UP
GLUCOSE SERPL-MCNC: 130 MG/DL — HIGH (ref 70–99)
GLUCOSE SERPL-MCNC: 86 MG/DL — SIGNIFICANT CHANGE UP (ref 70–99)
HCT VFR BLD CALC: 33.8 % — LOW (ref 37–47)
HGB BLD-MCNC: 10.7 G/DL — LOW (ref 12–16)
MAGNESIUM SERPL-MCNC: 1.7 MG/DL — LOW (ref 1.8–2.4)
MCHC RBC-ENTMCNC: 28.2 PG — SIGNIFICANT CHANGE UP (ref 27–31)
MCHC RBC-ENTMCNC: 31.7 G/DL — LOW (ref 32–37)
MCV RBC AUTO: 89.2 FL — SIGNIFICANT CHANGE UP (ref 81–99)
NRBC # BLD: 0 /100 WBCS — SIGNIFICANT CHANGE UP (ref 0–0)
PHOSPHATE SERPL-MCNC: 1.4 MG/DL — LOW (ref 2.1–4.9)
PLATELET # BLD AUTO: 131 K/UL — SIGNIFICANT CHANGE UP (ref 130–400)
PMV BLD: 11 FL — HIGH (ref 7.4–10.4)
POTASSIUM SERPL-MCNC: 3.5 MMOL/L — SIGNIFICANT CHANGE UP (ref 3.5–5)
POTASSIUM SERPL-MCNC: 3.5 MMOL/L — SIGNIFICANT CHANGE UP (ref 3.5–5)
POTASSIUM SERPL-SCNC: 3.5 MMOL/L — SIGNIFICANT CHANGE UP (ref 3.5–5)
POTASSIUM SERPL-SCNC: 3.5 MMOL/L — SIGNIFICANT CHANGE UP (ref 3.5–5)
PROT SERPL-MCNC: 4.9 G/DL — LOW (ref 6–8)
PROT SERPL-MCNC: 5.3 G/DL — LOW (ref 6–8)
RBC # BLD: 3.79 M/UL — LOW (ref 4.2–5.4)
RBC # FLD: 17.3 % — HIGH (ref 11.5–14.5)
SODIUM SERPL-SCNC: 136 MMOL/L — SIGNIFICANT CHANGE UP (ref 135–146)
SODIUM SERPL-SCNC: 139 MMOL/L — SIGNIFICANT CHANGE UP (ref 135–146)
WBC # BLD: 3.85 K/UL — LOW (ref 4.8–10.8)
WBC # FLD AUTO: 3.85 K/UL — LOW (ref 4.8–10.8)

## 2024-07-09 PROCEDURE — 76705 ECHO EXAM OF ABDOMEN: CPT | Mod: 26

## 2024-07-09 PROCEDURE — 99223 1ST HOSP IP/OBS HIGH 75: CPT

## 2024-07-09 PROCEDURE — 99232 SBSQ HOSP IP/OBS MODERATE 35: CPT | Mod: 25,24

## 2024-07-09 RX ORDER — ATORVASTATIN CALCIUM 20 MG/1
1 TABLET, FILM COATED ORAL
Refills: 0 | DISCHARGE

## 2024-07-09 RX ORDER — MONTELUKAST SODIUM 10 MG/1
1 TABLET, FILM COATED ORAL
Qty: 0 | Refills: 0 | DISCHARGE

## 2024-07-09 RX ORDER — DEXAMETHASONE 1 MG/1
1 TABLET ORAL
Refills: 0 | DISCHARGE

## 2024-07-09 RX ORDER — LENALIDOMIDE 25 MG/1
1 CAPSULE ORAL
Refills: 0 | DISCHARGE

## 2024-07-09 RX ORDER — SITAGLIPTIN AND METFORMIN HYDROCHLORIDE 50; 500 MG/1; MG/1
2 TABLET, FILM COATED ORAL
Refills: 0 | DISCHARGE

## 2024-07-09 RX ORDER — ASPIRIN 325 MG/1
1 TABLET, FILM COATED ORAL
Refills: 0 | DISCHARGE

## 2024-07-09 RX ORDER — PIPERACILLIN SODIUM AND TAZOBACTAM SODIUM 3; .375 G/15ML; G/15ML
3.38 INJECTION, POWDER, LYOPHILIZED, FOR SOLUTION INTRAVENOUS EVERY 8 HOURS
Refills: 0 | Status: DISCONTINUED | OUTPATIENT
Start: 2024-07-09 | End: 2024-07-11

## 2024-07-09 RX ORDER — LOSARTAN POTASSIUM 100 MG/1
1 TABLET, FILM COATED ORAL
Refills: 0 | DISCHARGE

## 2024-07-09 RX ORDER — ACYCLOVIR SODIUM 50 MG/ML
1 VIAL (ML) INTRAVENOUS
Refills: 0 | DISCHARGE

## 2024-07-09 RX ORDER — GLIPIZIDE 5 MG
1 TABLET ORAL
Refills: 0 | DISCHARGE

## 2024-07-09 RX ADMIN — Medication 650 MILLIGRAM(S): at 23:07

## 2024-07-09 RX ADMIN — Medication 650 MILLIGRAM(S): at 23:37

## 2024-07-09 RX ADMIN — LOSARTAN POTASSIUM 25 MILLIGRAM(S): 100 TABLET, FILM COATED ORAL at 06:00

## 2024-07-09 RX ADMIN — PIPERACILLIN SODIUM AND TAZOBACTAM SODIUM 25 GRAM(S): 3; .375 INJECTION, POWDER, LYOPHILIZED, FOR SOLUTION INTRAVENOUS at 14:03

## 2024-07-09 RX ADMIN — PIPERACILLIN SODIUM AND TAZOBACTAM SODIUM 25 GRAM(S): 3; .375 INJECTION, POWDER, LYOPHILIZED, FOR SOLUTION INTRAVENOUS at 21:36

## 2024-07-09 RX ADMIN — ASPIRIN 81 MILLIGRAM(S): 325 TABLET, FILM COATED ORAL at 11:55

## 2024-07-09 RX ADMIN — Medication 400 MILLIGRAM(S): at 06:00

## 2024-07-09 RX ADMIN — ATORVASTATIN CALCIUM 10 MILLIGRAM(S): 20 TABLET, FILM COATED ORAL at 21:36

## 2024-07-09 RX ADMIN — MONTELUKAST SODIUM 10 MILLIGRAM(S): 10 TABLET, FILM COATED ORAL at 11:55

## 2024-07-09 RX ADMIN — Medication 400 MILLIGRAM(S): at 17:21

## 2024-07-09 RX ADMIN — ENOXAPARIN SODIUM 40 MILLIGRAM(S): 100 INJECTION SUBCUTANEOUS at 21:36

## 2024-07-09 RX ADMIN — Medication 650 MILLIGRAM(S): at 11:54

## 2024-07-09 RX ADMIN — Medication 650 MILLIGRAM(S): at 00:27

## 2024-07-09 RX ADMIN — Medication 650 MILLIGRAM(S): at 17:20

## 2024-07-09 RX ADMIN — Medication 650 MILLIGRAM(S): at 06:00

## 2024-07-09 NOTE — PROGRESS NOTE ADULT - ASSESSMENT
ASSESSMENT:  69F with PMH left breast cancer s/p left breast mastectomy, open appendectomy,  x2, HTN, HLD, DM2, multiple myeloma on chemotherapy for last 2 months (4 months total)- last chemo 2 weeks prior, now 1 week post-op s/p cholecystectomy for cholecystitis and gallstone pancreatitis presents with epigastric abdominal pain, abdominal distention, nausea and multiple episodes of NBNB vomiting x 2 days. Patient states that she had acute onset of epigastric abdominal pain last night with associated chills, nausea and vomiting x4. She has been unable to tolerate PO intake. Last bowel movement was this morning.     PLAN:  - Continue pain management  - Continue DVT ppx  - Continue zosyn treatment  - NPO on IV at rate of 125 cc/hr  - Encourage ambulation  - Follow up on GI consult  - Monitor vitals  - Trend labs and replete as necessary     Trauma [id number] ASSESSMENT:  69F with PMH left breast cancer s/p left breast mastectomy, open appendectomy,  x2, HTN, HLD, DM2, multiple myeloma on chemotherapy for last 2 months (4 months total)- last chemo 2 weeks prior, now 1 week post-op s/p cholecystectomy for cholecystitis and gallstone pancreatitis presents with epigastric abdominal pain, abdominal distention, nausea and multiple episodes of NBNB vomiting x 2 days. Patient states that she had acute onset of epigastric abdominal pain last night with associated chills, nausea and vomiting x4. She has been unable to tolerate PO intake. Last bowel movement was this morning.     PLAN:  - Continue pain management  - Continue DVT ppx  - Continue IV Abx Zosyn  - NPO on IV at rate of 125 cc/hr  - Encourage ambulation  - Follow up on GI consult  - Monitor vitals  - Trend labs and replete as necessary     Trauma [id number]

## 2024-07-09 NOTE — PATIENT PROFILE ADULT - BILL PAYMENT
Progress note  7/7/2024    Patient seen by ortho and neurosurgery. Okd for discharge.  Patient without new issues this morning. Tired and has controlled pain at right elbow.   Discharge orders placed and discussed with mom and nursing staff.   no

## 2024-07-09 NOTE — PATIENT PROFILE ADULT - FALL HARM RISK - HARM RISK INTERVENTIONS
Assistance with ambulation/Assistance OOB with selected safe patient handling equipment/Communicate Risk of Fall with Harm to all staff/Monitor gait and stability/Reinforce activity limits and safety measures with patient and family/Review medications for side effects contributing to fall risk/Sit up slowly, dangle for a short time, stand at bedside before walking/Tailored Fall Risk Interventions/Toileting schedule using arm’s reach rule for commode and bathroom/Use of alarms - bed, chair and/or voice tab/Visual Cue: Yellow wristband and red socks/Bed in lowest position, wheels locked, appropriate side rails in place/Call bell, personal items and telephone in reach/Instruct patient to call for assistance before getting out of bed or chair/Non-slip footwear when patient is out of bed/[place] to call system/Physically safe environment - no spills, clutter or unnecessary equipment/Purposeful Proactive Rounding/Room/bathroom lighting operational, light cord in reach

## 2024-07-09 NOTE — CONSULT NOTE ADULT - SUBJECTIVE AND OBJECTIVE BOX
Gastroenterology Consultation:    Patient is a 69y old  Female who presents with a chief complaint of gallstone pancreatitis (2024 20:17)    Admitted on: 24    HPI:  69F with PMH left breast cancer s/p left breast mastectomy, open appendectomy,  x2, HTN, HLD, DM2, multiple myeloma on chemotherapy for last 2 months (4 months total)- last chemo 2 weeks prior, now 1 week post-op s/p cholecystectomy for cholecystitis and gallstone pancreatitis presents with epigastric abdominal pain, abdominal distention, nausea and multiple episodes of NBNB vomiting x 2 days. Patient states that she had acute onset of epigastric abdominal pain last night with associated chills, nausea and vomiting x4 which began this morning. She has been unable to tolerate PO intake. Last bowel movement was this morning. GI was consulted for pancreatitis.       Prior EGD/ Colonoscopy:  < from: EGD-Colonoscopy (22 @ 09:45) >  EGD Impressions:    -There was some mild esophagitis at the EG junction, Grade A biopsy obtained,  otherwise normal esophagus. .    -There was some mild erosive gastritis noted in the antrum, biopsy obtained.  Mild erythema noted in the gastric body and a couple of 2 mm sessile polyps  noted. Biopsies obtained. Remainder of the stomach appeared normal. .    Normal mucosa in the whole examined duodenum.    -Biopsy obtained.    < end of copied text >  < from: EGD-Colonoscopy (22 @ 09:45) >  Colonoscopy Impressions:    -There was a 4mm sessile polyp noted in the sigmoid colon. Polyp removed by  cold biopsy. There was a 2 mm sessile polyp noted in the transverse colon  removed by cold biopsy. There was some mild diverticulosis in the sigmoid colon.  Small hemorrhoids noted. Colonwas tortouos otherwise remainder of the colon was  normal. .    Appendiceal Orifice Visualized.    < end of copied text >      PAST MEDICAL & SURGICAL HISTORY:  High cholesterol      DM (diabetes mellitus)      H/O left mastectomy      Single delivery by  section      History of tonsillectomy            FAMILY HISTORY:  No FHx of GI surgery     Social History:  No smoking alcohol or substance use     Home Medications:  acyclovir 400 mg oral tablet: 1 tab(s) orally 2 times a day (2024 00:50)  aspirin 81 mg oral capsule: 1 cap(s) orally once a day (2024 00:50)  atorvastatin 10 mg oral tablet: 1 tab(s) orally once a day (at bedtime) (:50)  glipiZIDE 5 mg oral tablet: 1 tab(s) orally once a day (:50)  Hemady 20 mg oral tablet: 1 tab(s) orally every 7 days (:50)  Janumet XR 50 mg-1000 mg oral tablet, extended release: 2 tab(s) orally 2 times a day (:50)  losartan 25 mg oral tablet: 1 tab(s) orally once a day (:50)  montelukast 10 mg oral tablet: 1 tab(s) orally once a day (:50)  Revlimid 15 mg oral capsule: 1 cap(s) orally once a day (:50)        MEDICATIONS  (STANDING):  acetaminophen     Tablet .. 650 milliGRAM(s) Oral every 6 hours  acyclovir   Oral Tab/Cap 400 milliGRAM(s) Oral two times a day  aspirin  chewable 81 milliGRAM(s) Oral daily  atorvastatin 10 milliGRAM(s) Oral at bedtime  dextrose 50% Injectable 25 Gram(s) IV Push once  enoxaparin Injectable 40 milliGRAM(s) SubCutaneous every 24 hours  insulin lispro (ADMELOG) corrective regimen sliding scale   SubCutaneous every 8 hours  lactated ringers. 1000 milliLiter(s) (125 mL/Hr) IV Continuous <Continuous>  losartan 25 milliGRAM(s) Oral daily  montelukast 10 milliGRAM(s) Oral daily    MEDICATIONS  (PRN):  ketorolac   Injectable 15 milliGRAM(s) IV Push every 6 hours PRN Severe Pain (7 - 10)      Allergies  [This allergen will not trigger allergy alert] swordfish (Swelling)  No Known Drug Allergies      Review of Systems:   Constitutional:  No Fever, No Chills  ENT/Mouth:  No Hearing Changes,  No Difficulty Swallowing  Eyes:  No Eye Pain, No Vision Changes  Cardiovascular:  No Chest Pain, No Palpitations  Respiratory:  No Cough, No Dyspnea  Gastrointestinal:  As described in HPI  Musculoskeletal:  No Joint Swelling, No Back Pain  Skin:  No Skin Lesions, No Jaundice  Neuro:  No Syncope, No Dizziness  Heme/Lymph:  No Bruising, No Bleeding.          Physical Examination:  T(C): 36.8 (07-09-24 @ 07:40), Max: 37.5 (24 @ 16:50)  HR: 71 (24 @ 07:40) (70 - 84)  BP: 120/77 (24 @ 07:40) (114/72 - 137/83)  RR: 18 (24 @ 07:40) (18 - 18)  SpO2: 97% (24 @ 07:40) (95% - 98%)  Height (cm): 162.6 (24 @ 16:50)  Weight (kg): 84.4 (24 @ 16:50)        GENERAL: AAOx3,  HEAD:  Atraumatic, Normocephalic  EYES: conjunctiva and sclera clear  NECK: Supple, no JVD or thyromegaly  CHEST/LUNG: Clear to auscultation bilaterally  HEART: Regular rate and rhythm; normal S1, S2, No murmurs.  ABDOMEN: Soft, nontender, nondistended  NEUROLOGY: No asterixis or tremor.   SKIN: Intact, no jaundice        Data:                        12.1   3.64  )-----------( 178      ( 2024 18:10 )             36.4     Hgb Trend:  12.1  24 @ 18:10      07    139  |  102  |  8<L>  ----------------------------<  204<H>  4.4   |  24  |  0.5<L>    Ca    9.4      2024 18:10    TPro  5.8<L>  /  Alb  4.0  /  TBili  4.1<H>  /  DBili  2.4<H>  /  AST  298<H>  /  ALT  261<H>  /  AlkPhos  416<H>      Liver panel trend:  TBili 4.1   /      /      /   AlkP 416   /   Tptn 5.8   /   Alb 4.0    /   DBili 2.4        TBili 1.4   /   AST 33   /   ALT 40   /   AlkP 105   /   Tptn 5.4   /   Alb 3.6    /   DBili 0.3        TBili 1.8   /   AST 15   /   ALT 37   /   AlkP 107   /   Tptn 5.8   /   Alb 3.8    /   DBili 0.3      06-30  TBili 1.8   /   AST 17   /   ALT 44   /   AlkP 95   /   Tptn 5.0   /   Alb 3.5    /   DBili 0.4      -  TBili 1.8   /   AST 21   /   ALT 52   /   AlkP 101   /   Tptn 5.3   /   Alb 3.7    /   DBili 0.4      -          Urinalysis with Rflx Culture (collected 2024 18:20)          Radiology:  CT Abdomen and Pelvis w/ IV Cont:   ACC: [id number] EXAM:  CT ABDOMEN AND PELVIS IC   ORDERED BY: LTAASHA SOLIS     PROCEDURE DATE:  2024          INTERPRETATION:  CLINICAL HISTORY / REASON FOR EXAM: Abdominal pain   status post cholecystectomy.    TECHNIQUE: Contiguous axial CT images were obtained from the lower chest   to the pubic symphysis following the administration of 100 mL Omnipaque   350 intravenous contrast. Oral contrast was not administered. Reformatted   images in the coronal and sagittal planes were acquired.    COMPARISON CT: CT abdomen and pelvis from 2024    OTHER STUDIES USED FOR CORRELATION: None.      FINDINGS:    LOWER CHEST: Unremarkable.    HEPATOBILIARY: Hepatomegaly with cirrhotic features. Small collection   within the gallbladder fossa measuring approximately 2.3 x 1.7 cm (series   8, image 32). Trace perihepatic fluid. Stable left hepatic lobe   subcentimeter hypodensity, incompletely characterized. Common bile duct   measuring approximately 0.8 cm, within normal limits.    SPLEEN: Splenomegaly measuring up to 16 cm in craniocaudal dimension   (series 8, image 7), stable. Trace low-density perisplenic fluid.    PANCREAS: Unremarkable.    ADRENAL GLANDS: Unremarkable.    KIDNEYS: Symmetric enhancement bilaterally. No evidence of   hydronephrosis. Subcentimeter hypodensities, too small to further   characterize.    ABDOMINOPELVIC NODES: Unremarkable.    PELVIC ORGANS: Unremarkable.    PERITONEUM/MESENTERY/BOWEL: Small volume lower pelvic ascites. No bowel   obstruction or intraperitoneal free air.    BONES/SOFT TISSUES: Diffuse osteopenia. Degenerative changes of the   thoracolumbar spine. External left breast prosthesis. Anasarca.    VASCULAR: Dilated main portal and splenic veins. Periumbilical   collaterals.      IMPRESSION:    Small collection in the gallbladder fossa with additional perihepatic   fluid. Differential includes biloma. Consider evaluation with right upper   quadrant ultrasound and HIDA scan as clinically indicated.    Stable findings associated with cirrhosis with sequelae of portal   hypertension including splenomegaly and dilated splenic and portal veins.    --- End of Report ---            KEYUR BRADY MD; Attending Radiologist  This document has been electronically signed. 2024  8:29PM (24 @ 19:43)    US Abdomen Upper Quadrant Right:   ACC: [id number] EXAM:  US ABDOMEN RT UPR QUADRANT   ORDERED BY: STEPHANIE MILLS     PROCEDURE DATE:  2024          INTERPRETATION:  CLINICAL INFORMATION: Elevated bilirubin.   Postcholecystectomy.    Comparison made with CT abdomen and pelvis 2024, abdominal ultrasound   2024.    TECHNIQUE: Sonography of the right upper quadrant.    FINDINGS:  Liver: Within normal limits.  Mild perihepatic fluid, with gallbladder fossa fluid better appreciated   on CT.  No intrahepatic biliary dilation. Limited evaluation of extrahepatic   biliary ducts.  Postcholecystectomy.  Pancreas obscured by overlying bowel gas.  Right kidney: 11.6 cm in length and. No hydronephrosis.  IVC: Visualized portions are within normal limits.    IMPRESSION:  Postcholecystectomy.  Mild perihepatic fluid, with gallbladder fossa fluid better appreciated   on CT.  No intrahepatic biliary dilation. Limited evaluation of extrahepatic   biliary ducts.    --- End of Report ---            DEBBIE NOEL MD; Attending Radiologist  This document has been electronically signed. 2024  1:20AM (24 @ 01:17)     Gastroenterology Consultation:    Patient is a 69y old  Female who presents with a chief complaint of gallstone pancreatitis (2024 20:17)    Admitted on: 24    HPI:  69F with PMH left breast cancer s/p left breast mastectomy, open appendectomy,  x2, HTN, HLD, DM2, multiple myeloma on chemotherapy for last 2 months (4 months total)- last chemo 2 weeks prior, now 1 week post-op s/p cholecystectomy for cholecystitis and gallstone pancreatitis presents with epigastric abdominal pain, abdominal distention, nausea and multiple episodes of NBNB vomiting x 2 days. Patient states that she had acute onset of epigastric abdominal pain last night with associated chills, nausea and vomiting x4 which began this morning. She has been unable to tolerate PO intake. Last bowel movement was this morning. GI was consulted for pancreatitis.       Prior EGD/ Colonoscopy:  < from: EGD-Colonoscopy (22 @ 09:45) >  EGD Impressions:    -There was some mild esophagitis at the EG junction, Grade A biopsy obtained,  otherwise normal esophagus. .    -There was some mild erosive gastritis noted in the antrum, biopsy obtained.  Mild erythema noted in the gastric body and a couple of 2 mm sessile polyps  noted. Biopsies obtained. Remainder of the stomach appeared normal. .    Normal mucosa in the whole examined duodenum.    -Biopsy obtained.    < end of copied text >  < from: EGD-Colonoscopy (22 @ 09:45) >  Colonoscopy Impressions:    -There was a 4mm sessile polyp noted in the sigmoid colon. Polyp removed by  cold biopsy. There was a 2 mm sessile polyp noted in the transverse colon  removed by cold biopsy. There was some mild diverticulosis in the sigmoid colon.  Small hemorrhoids noted. Colonwas tortouos otherwise remainder of the colon was  normal. .    Appendiceal Orifice Visualized.    < end of copied text >      PAST MEDICAL & SURGICAL HISTORY:  High cholesterol      DM (diabetes mellitus)      H/O left mastectomy      Single delivery by  section      History of tonsillectomy            FAMILY HISTORY:  No FHx of GI surgery     Social History:  No smoking alcohol or substance use     Home Medications:  acyclovir 400 mg oral tablet: 1 tab(s) orally 2 times a day (2024 00:50)  aspirin 81 mg oral capsule: 1 cap(s) orally once a day (2024 00:50)  atorvastatin 10 mg oral tablet: 1 tab(s) orally once a day (at bedtime) (:50)  glipiZIDE 5 mg oral tablet: 1 tab(s) orally once a day (:50)  Hemady 20 mg oral tablet: 1 tab(s) orally every 7 days (:50)  Janumet XR 50 mg-1000 mg oral tablet, extended release: 2 tab(s) orally 2 times a day (:50)  losartan 25 mg oral tablet: 1 tab(s) orally once a day (:50)  montelukast 10 mg oral tablet: 1 tab(s) orally once a day (:50)  Revlimid 15 mg oral capsule: 1 cap(s) orally once a day (:50)        MEDICATIONS  (STANDING):  acetaminophen     Tablet .. 650 milliGRAM(s) Oral every 6 hours  acyclovir   Oral Tab/Cap 400 milliGRAM(s) Oral two times a day  aspirin  chewable 81 milliGRAM(s) Oral daily  atorvastatin 10 milliGRAM(s) Oral at bedtime  dextrose 50% Injectable 25 Gram(s) IV Push once  enoxaparin Injectable 40 milliGRAM(s) SubCutaneous every 24 hours  insulin lispro (ADMELOG) corrective regimen sliding scale   SubCutaneous every 8 hours  lactated ringers. 1000 milliLiter(s) (125 mL/Hr) IV Continuous <Continuous>  losartan 25 milliGRAM(s) Oral daily  montelukast 10 milliGRAM(s) Oral daily    MEDICATIONS  (PRN):  ketorolac   Injectable 15 milliGRAM(s) IV Push every 6 hours PRN Severe Pain (7 - 10)      Allergies  [This allergen will not trigger allergy alert] swordfish (Swelling)  No Known Drug Allergies      Review of Systems:   Constitutional:  No Fever, No Chills  ENT/Mouth:  No Hearing Changes,  No Difficulty Swallowing  Eyes:  No Eye Pain, No Vision Changes  Cardiovascular:  No Chest Pain, No Palpitations  Respiratory:  No Cough, No Dyspnea  Gastrointestinal:  As described in HPI  Musculoskeletal:  No Joint Swelling, No Back Pain  Skin:  No Skin Lesions, No Jaundice  Neuro:  No Syncope, No Dizziness  Heme/Lymph:  No Bruising, No Bleeding.          Physical Examination:  T(C): 36.8 (07-09-24 @ 07:40), Max: 37.5 (24 @ 16:50)  HR: 71 (24 @ 07:40) (70 - 84)  BP: 120/77 (24 @ 07:40) (114/72 - 137/83)  RR: 18 (24 @ 07:40) (18 - 18)  SpO2: 97% (24 @ 07:40) (95% - 98%)  Height (cm): 162.6 (24 @ 16:50)  Weight (kg): 84.4 (24 @ 16:50)        GENERAL: AAOx3,  HEAD:  Atraumatic, Normocephalic  EYES: conjunctiva and sclera clear  NECK: Supple, no JVD or thyromegaly  CHEST/LUNG: Clear to auscultation bilaterally  HEART: Regular rate and rhythm  ABDOMEN: Soft, nontender, nondistended  NEUROLOGY: No asterixis or tremor.   SKIN: Intact, no jaundice        Data:                        12.1   3.64  )-----------( 178      ( 2024 18:10 )             36.4     Hgb Trend:  12.1  24 @ 18:10          139  |  102  |  8<L>  ----------------------------<  204<H>  4.4   |  24  |  0.5<L>    Ca    9.4      2024 18:10    TPro  5.8<L>  /  Alb  4.0  /  TBili  4.1<H>  /  DBili  2.4<H>  /  AST  298<H>  /  ALT  261<H>  /  AlkPhos  416<H>      Liver panel trend:  TBili 4.1   /      /      /   AlkP 416   /   Tptn 5.8   /   Alb 4.0    /   DBili 2.4        TBili 1.4   /   AST 33   /   ALT 40   /   AlkP 105   /   Tptn 5.4   /   Alb 3.6    /   DBili 0.3        TBili 1.8   /   AST 15   /   ALT 37   /   AlkP 107   /   Tptn 5.8   /   Alb 3.8    /   DBili 0.3      06-30  TBili 1.8   /   AST 17   /   ALT 44   /   AlkP 95   /   Tptn 5.0   /   Alb 3.5    /   DBili 0.4      06-  TBili 1.8   /   AST 21   /   ALT 52   /   AlkP 101   /   Tptn 5.3   /   Alb 3.7    /   DBili 0.4      06-          Urinalysis with Rflx Culture (collected 2024 18:20)          Radiology:  CT Abdomen and Pelvis w/ IV Cont:   ACC: [id number] EXAM:  CT ABDOMEN AND PELVIS IC   ORDERED BY: LATASHA SOLIS     PROCEDURE DATE:  2024          INTERPRETATION:  CLINICAL HISTORY / REASON FOR EXAM: Abdominal pain   status post cholecystectomy.    TECHNIQUE: Contiguous axial CT images were obtained from the lower chest   to the pubic symphysis following the administration of 100 mL Omnipaque   350 intravenous contrast. Oral contrast was not administered. Reformatted   images in the coronal and sagittal planes were acquired.    COMPARISON CT: CT abdomen and pelvis from 2024    OTHER STUDIES USED FOR CORRELATION: None.      FINDINGS:    LOWER CHEST: Unremarkable.    HEPATOBILIARY: Hepatomegaly with cirrhotic features. Small collection   within the gallbladder fossa measuring approximately 2.3 x 1.7 cm (series   8, image 32). Trace perihepatic fluid. Stable left hepatic lobe   subcentimeter hypodensity, incompletely characterized. Common bile duct   measuring approximately 0.8 cm, within normal limits.    SPLEEN: Splenomegaly measuring up to 16 cm in craniocaudal dimension   (series 8, image 7), stable. Trace low-density perisplenic fluid.    PANCREAS: Unremarkable.    ADRENAL GLANDS: Unremarkable.    KIDNEYS: Symmetric enhancement bilaterally. No evidence of   hydronephrosis. Subcentimeter hypodensities, too small to further   characterize.    ABDOMINOPELVIC NODES: Unremarkable.    PELVIC ORGANS: Unremarkable.    PERITONEUM/MESENTERY/BOWEL: Small volume lower pelvic ascites. No bowel   obstruction or intraperitoneal free air.    BONES/SOFT TISSUES: Diffuse osteopenia. Degenerative changes of the   thoracolumbar spine. External left breast prosthesis. Anasarca.    VASCULAR: Dilated main portal and splenic veins. Periumbilical   collaterals.      IMPRESSION:    Small collection in the gallbladder fossa with additional perihepatic   fluid. Differential includes biloma. Consider evaluation with right upper   quadrant ultrasound and HIDA scan as clinically indicated.    Stable findings associated with cirrhosis with sequelae of portal   hypertension including splenomegaly and dilated splenic and portal veins.    --- End of Report ---        KEYUR BRADY MD; Attending Radiologist  This document has been electronically signed. 2024  8:29PM (24 @ 19:43)    US Abdomen Upper Quadrant Right:   ACC: [id number] EXAM:  US ABDOMEN RT UPR QUADRANT   ORDERED BY: STEPHANIE MILLS     PROCEDURE DATE:  2024          INTERPRETATION:  CLINICAL INFORMATION: Elevated bilirubin.   Postcholecystectomy.    Comparison made with CT abdomen and pelvis 2024, abdominal ultrasound   2024.    TECHNIQUE: Sonography of the right upper quadrant.    FINDINGS:  Liver: Within normal limits.  Mild perihepatic fluid, with gallbladder fossa fluid better appreciated   on CT.  No intrahepatic biliary dilation. Limited evaluation of extrahepatic   biliary ducts.  Postcholecystectomy.  Pancreas obscured by overlying bowel gas.  Right kidney: 11.6 cm in length and. No hydronephrosis.  IVC: Visualized portions are within normal limits.    IMPRESSION:  Postcholecystectomy.  Mild perihepatic fluid, with gallbladder fossa fluid better appreciated   on CT.  No intrahepatic biliary dilation. Limited evaluation of extrahepatic   biliary ducts.    --- End of Report ---            DEBBIE NOEL MD; Attending Radiologist  This document has been electronically signed. 2024  1:20AM (24 @ 01:17)

## 2024-07-09 NOTE — ED ADULT NURSE REASSESSMENT NOTE - NS ED NURSE REASSESS COMMENT FT1
patient admitted to surg and moved to ED4 bed 11. Report given to JOSE ALBERTO Means. Patient in stable condition and nad.

## 2024-07-09 NOTE — PROGRESS NOTE ADULT - SUBJECTIVE AND OBJECTIVE BOX
GENERAL SURGERY PROGRESS NOTE    Patient: RAJIV COLLADO , 69y (55)Female   MRN: [id number]  Location: [facility] ED Hold 008 A  Visit: 24 Inpatient  Date: 24 @ 10:29    Hospital Day #: 2  Post-Op Day #:    Procedure/Dx/Injuries: Gallstone pancreatitis    Events of past 24 hours: NAEON. unable to tolerate PO intake. Last BM this morning.     PAST MEDICAL & SURGICAL HISTORY:  High cholesterol      DM (diabetes mellitus)      H/O left mastectomy      Single delivery by  section      History of tonsillectomy          Vitals:   T(F): 98.3 (24 @ 07:40), Max: 99.5 (24 @ 16:50)  HR: 71 (24 @ 07:40)  BP: 120/77 (24 @ 07:40)  RR: 18 (24 @ 07:40)  SpO2: 97% (24 @ 07:40)      Diet, NPO:   Except Medications      Fluids: lactated ringers.: Solution, 1000 milliLiter(s) infuse at 125 mL/Hr      I & O's:      PHYSICAL EXAM:  General: NAD, AAOx3, calm and cooperative  Respiratory: Normal respiratory effort  Abdomen: Soft, non-distended, tender in epigastric region  Skin: Warm/dry, normal color, no jaundice      MEDICATIONS  (STANDING):  acetaminophen     Tablet .. 650 milliGRAM(s) Oral every 6 hours  acyclovir   Oral Tab/Cap 400 milliGRAM(s) Oral two times a day  aspirin  chewable 81 milliGRAM(s) Oral daily  atorvastatin 10 milliGRAM(s) Oral at bedtime  dextrose 50% Injectable 25 Gram(s) IV Push once  enoxaparin Injectable 40 milliGRAM(s) SubCutaneous every 24 hours  insulin lispro (ADMELOG) corrective regimen sliding scale   SubCutaneous every 8 hours  lactated ringers. 1000 milliLiter(s) (125 mL/Hr) IV Continuous <Continuous>  losartan 25 milliGRAM(s) Oral daily  montelukast 10 milliGRAM(s) Oral daily  piperacillin/tazobactam IVPB.. 3.375 Gram(s) IV Intermittent every 8 hours    MEDICATIONS  (PRN):  ketorolac   Injectable 15 milliGRAM(s) IV Push every 6 hours PRN Severe Pain (7 - 10)      DVT PROPHYLAXIS: enoxaparin Injectable 40 milliGRAM(s) SubCutaneous every 24 hours    GI PROPHYLAXIS:   ANTICOAGULATION:   ANTIBIOTICS:  acyclovir   Oral Tab/Cap 400 milliGRAM(s)  piperacillin/tazobactam IVPB.. 3.375 Gram(s)            LAB/STUDIES:  Labs:  CAPILLARY BLOOD GLUCOSE      POCT Blood Glucose.: 135 mg/dL (2024 08:15)  POCT Blood Glucose.: 141 mg/dL (2024 22:13)                          12.1   3.64  )-----------( 178      ( 2024 18:10 )             36.4       Auto Neutrophil %: 71.2 % (24 @ 18:10)  Auto Immature Granulocyte %: 0.3 % (24 @ 18:10)        139  |  102  |  8<L>  ----------------------------<  204<H>  4.4   |  24  |  0.5<L>      Calcium: 9.4 mg/dL (24 @ 18:10)      LFTs:             5.8  | 4.1  | 298      ------------------[416     ( 2024 18:10 )  4.0  | 2.4  | 261         Lipase:>3000  Amylase:x         Lactate, Blood: 1.2 mmol/L (24 @ 18:10)      Coags:            Urinalysis Basic - ( 2024 18:20 )    Color: Dark Yellow / Appearance: Clear / S.016 / pH: x  Gluc: x / Ketone: Trace mg/dL  / Bili: Small / Urobili: 1.0 mg/dL   Blood: x / Protein: Trace mg/dL / Nitrite: Negative   Leuk Esterase: Negative / RBC: x / WBC x   Sq Epi: x / Non Sq Epi: x / Bacteria: x        Urinalysis with Rflx Culture (collected 2024 18:20)              IMAGING:  n/a       GENERAL SURGERY PROGRESS NOTE    Patient: RAJIV COLLADO , 69y (55)Female   MRN: [id number]  Location: [facility] ED Hold 008 A  Visit: 24 Inpatient  Date: 24 @ 10:29    Hospital Day #: 2  Post-Op Day #:    Procedure/Dx/Injuries: Gallstone pancreatitis    Events of past 24 hours: NAEON. Unable to tolerate PO intake. Last BM this morning.     PAST MEDICAL & SURGICAL HISTORY:  High cholesterol  DM (diabetes mellitus)  H/O left mastectomy  Single delivery by  section  History of tonsillectomy    Vitals:   T(F): 98.3 (24 @ 07:40), Max: 99.5 (24 @ 16:50)  HR: 71 (24 @ 07:40)  BP: 120/77 (24 @ 07:40)  RR: 18 (24 @ 07:40)  SpO2: 97% (24 @ 07:40)      Diet, NPO:   Except Medications  Fluids: lactated ringers.: Solution, 1000 milliLiter(s) infuse at 125 mL/Hr    I & O's:    PHYSICAL EXAM:  General: NAD, AAOx3, calm and cooperative  Respiratory: Normal respiratory effort  Abdomen: Soft, non-distended, tender in epigastric region  Skin: Warm/dry, normal color, no jaundice      MEDICATIONS  (STANDING):  acetaminophen     Tablet .. 650 milliGRAM(s) Oral every 6 hours  acyclovir   Oral Tab/Cap 400 milliGRAM(s) Oral two times a day  aspirin  chewable 81 milliGRAM(s) Oral daily  atorvastatin 10 milliGRAM(s) Oral at bedtime  dextrose 50% Injectable 25 Gram(s) IV Push once  enoxaparin Injectable 40 milliGRAM(s) SubCutaneous every 24 hours  insulin lispro (ADMELOG) corrective regimen sliding scale   SubCutaneous every 8 hours  lactated ringers. 1000 milliLiter(s) (125 mL/Hr) IV Continuous <Continuous>  losartan 25 milliGRAM(s) Oral daily  montelukast 10 milliGRAM(s) Oral daily  piperacillin/tazobactam IVPB.. 3.375 Gram(s) IV Intermittent every 8 hours    MEDICATIONS  (PRN):  ketorolac   Injectable 15 milliGRAM(s) IV Push every 6 hours PRN Severe Pain (7 - 10)    DVT PROPHYLAXIS: enoxaparin Injectable 40 milliGRAM(s) SubCutaneous every 24 hours    GI PROPHYLAXIS:   ANTICOAGULATION:   ANTIBIOTICS:  acyclovir   Oral Tab/Cap 400 milliGRAM(s)  piperacillin/tazobactam IVPB.. 3.375 Gram(s)    LAB/STUDIES:  CAPILLARY BLOOD GLUCOSE  POCT Blood Glucose.: 135 mg/dL (2024 08:15)  POCT Blood Glucose.: 141 mg/dL (2024 22:13)                        12.1   3.64  )-----------( 178      ( 2024 18:10 )             36.4       Auto Neutrophil %: 71.2 % (24 @ 18:10)  Auto Immature Granulocyte %: 0.3 % (24 @ 18:10)      139  |  102  |  8<L>  ----------------------------<  204<H>  4.4   |  24  |  0.5<L>  Calcium: 9.4 mg/dL (24 @ 18:10)    LFTs:          5.8  | 4.1  | 298      ------------------[416     ( 2024 18:10 )  4.0  | 2.4  | 261         Lipase:>3000  Lactate, Blood: 1.2 mmol/L (24 @ 18:10)    Urinalysis Basic - ( 2024 18:20 )    Color: Dark Yellow / Appearance: Clear / S.016 / pH: x  Gluc: x / Ketone: Trace mg/dL  / Bili: Small / Urobili: 1.0 mg/dL   Blood: x / Protein: Trace mg/dL / Nitrite: Negative   Leuk Esterase: Negative / RBC: x / WBC x   Sq Epi: x / Non Sq Epi: x / Bacteria: x    Urinalysis with Rflx Culture (collected 2024 18:20)    IMAGING:  n/a

## 2024-07-09 NOTE — CONSULT NOTE ADULT - ASSESSMENT
S/P cholecystectomy.  Collection within the gallbladder fossa   Hepatic hypodensity  Small pelvic ascites    Liver cirrhosis   CBD 8 mm  69F with PMH left breast cancer s/p left breast mastectomy, open appendectomy,  x2, HTN, HLD, DM2, multiple myeloma on chemotherapy for last 2 months (4 months total)- last chemo 2 weeks prior, now 1 week post-op s/p cholecystectomy for cholecystitis and gallstone pancreatitis presents with epigastric abdominal pain, abdominal distention, nausea and multiple episodes of NBNB vomiting x 2 days. Patient states that she had acute onset of epigastric abdominal pain last night with associated chills, nausea and vomiting x4 which began this morning. She has been unable to tolerate PO intake. Last bowel movement was this morning. GI was consulted for pancreatitis.     S/P cholecystectomy.  Collection within the gallbladder fossa   Hepatic hypodensity  Small pelvic ascites    Liver cirrhosis   CBD 8 mm  69F with PMH left breast cancer s/p left breast mastectomy, open appendectomy,  x2, HTN, HLD, DM2, multiple myeloma on chemotherapy for last 2 months (4 months total)- last chemo 2 weeks prior, now 1 week post-op s/p cholecystectomy for cholecystitis and gallstone pancreatitis presents with epigastric abdominal pain, abdominal distention, nausea and multiple episodes of NBNB vomiting x 2 days. Patient states that she had acute onset of epigastric abdominal pain last night with associated chills, nausea and vomiting x4 which began this morning. She has been unable to tolerate PO intake. Last bowel movement was this morning. GI was consulted for pancreatitis.     Acute pancreatitis likely gallstone   S/P cholecystectomy.  Collection within the gallbladder fossa   Hepatic hypodensity  Small pelvic ascites    Liver cirrhosis   CBD 8 mm     PLAN   NPO   LR @ 150 cc/ hour   Trend LFT   Plan for ERCP tomorrow   Monitor hemodynamics and for fevers   Hepatology evaluation, will need MR liver and workup for cirrhosis   Will follow

## 2024-07-10 LAB
ALBUMIN SERPL ELPH-MCNC: 3.4 G/DL — LOW (ref 3.5–5.2)
ALP SERPL-CCNC: 341 U/L — HIGH (ref 30–115)
ALT FLD-CCNC: 131 U/L — HIGH (ref 0–41)
ANION GAP SERPL CALC-SCNC: 13 MMOL/L — SIGNIFICANT CHANGE UP (ref 7–14)
AST SERPL-CCNC: 55 U/L — HIGH (ref 0–41)
BASOPHILS # BLD AUTO: 0.02 K/UL — SIGNIFICANT CHANGE UP (ref 0–0.2)
BASOPHILS NFR BLD AUTO: 0.5 % — SIGNIFICANT CHANGE UP (ref 0–1)
BILIRUB DIRECT SERPL-MCNC: 0.6 MG/DL — HIGH (ref 0–0.3)
BILIRUB INDIRECT FLD-MCNC: 1 MG/DL — SIGNIFICANT CHANGE UP (ref 0.2–1.2)
BILIRUB SERPL-MCNC: 1.6 MG/DL — HIGH (ref 0.2–1.2)
BUN SERPL-MCNC: 7 MG/DL — LOW (ref 10–20)
CALCIUM SERPL-MCNC: 8 MG/DL — LOW (ref 8.4–10.4)
CHLORIDE SERPL-SCNC: 104 MMOL/L — SIGNIFICANT CHANGE UP (ref 98–110)
CO2 SERPL-SCNC: 22 MMOL/L — SIGNIFICANT CHANGE UP (ref 17–32)
CREAT SERPL-MCNC: <0.5 MG/DL — LOW (ref 0.7–1.5)
EGFR: 107 ML/MIN/1.73M2 — SIGNIFICANT CHANGE UP
EOSINOPHIL # BLD AUTO: 0 K/UL — SIGNIFICANT CHANGE UP (ref 0–0.7)
EOSINOPHIL NFR BLD AUTO: 0 % — SIGNIFICANT CHANGE UP (ref 0–8)
GLUCOSE SERPL-MCNC: 163 MG/DL — HIGH (ref 70–99)
HCT VFR BLD CALC: 34.5 % — LOW (ref 37–47)
HGB BLD-MCNC: 11.4 G/DL — LOW (ref 12–16)
IMM GRANULOCYTES NFR BLD AUTO: 0.7 % — HIGH (ref 0.1–0.3)
LYMPHOCYTES # BLD AUTO: 0.71 K/UL — LOW (ref 1.2–3.4)
LYMPHOCYTES # BLD AUTO: 16.7 % — LOW (ref 20.5–51.1)
MAGNESIUM SERPL-MCNC: 2 MG/DL — SIGNIFICANT CHANGE UP (ref 1.8–2.4)
MCHC RBC-ENTMCNC: 28.7 PG — SIGNIFICANT CHANGE UP (ref 27–31)
MCHC RBC-ENTMCNC: 33 G/DL — SIGNIFICANT CHANGE UP (ref 32–37)
MCV RBC AUTO: 86.9 FL — SIGNIFICANT CHANGE UP (ref 81–99)
MONOCYTES # BLD AUTO: 0.2 K/UL — SIGNIFICANT CHANGE UP (ref 0.1–0.6)
MONOCYTES NFR BLD AUTO: 4.7 % — SIGNIFICANT CHANGE UP (ref 1.7–9.3)
NEUTROPHILS # BLD AUTO: 3.29 K/UL — SIGNIFICANT CHANGE UP (ref 1.4–6.5)
NEUTROPHILS NFR BLD AUTO: 77.4 % — HIGH (ref 42.2–75.2)
NRBC # BLD: 0 /100 WBCS — SIGNIFICANT CHANGE UP (ref 0–0)
PHOSPHATE SERPL-MCNC: 1.7 MG/DL — LOW (ref 2.1–4.9)
PLATELET # BLD AUTO: 160 K/UL — SIGNIFICANT CHANGE UP (ref 130–400)
PMV BLD: 11.1 FL — HIGH (ref 7.4–10.4)
POTASSIUM SERPL-MCNC: 3.9 MMOL/L — SIGNIFICANT CHANGE UP (ref 3.5–5)
POTASSIUM SERPL-SCNC: 3.9 MMOL/L — SIGNIFICANT CHANGE UP (ref 3.5–5)
PROT SERPL-MCNC: 5 G/DL — LOW (ref 6–8)
RBC # BLD: 3.97 M/UL — LOW (ref 4.2–5.4)
RBC # FLD: 17.1 % — HIGH (ref 11.5–14.5)
SODIUM SERPL-SCNC: 139 MMOL/L — SIGNIFICANT CHANGE UP (ref 135–146)
WBC # BLD: 4.25 K/UL — LOW (ref 4.8–10.8)
WBC # FLD AUTO: 4.25 K/UL — LOW (ref 4.8–10.8)

## 2024-07-10 PROCEDURE — 99232 SBSQ HOSP IP/OBS MODERATE 35: CPT | Mod: 24,25

## 2024-07-10 PROCEDURE — 74328 X-RAY BILE DUCT ENDOSCOPY: CPT | Mod: 26

## 2024-07-10 PROCEDURE — 43262 ENDO CHOLANGIOPANCREATOGRAPH: CPT

## 2024-07-10 PROCEDURE — 43264 ERCP REMOVE DUCT CALCULI: CPT

## 2024-07-10 RX ORDER — POTASSIUM PHOSPHATE, MONOBASIC POTASSIUM PHOSPHATE, DIBASIC INJECTION, 236; 224 MG/ML; MG/ML
15 SOLUTION, CONCENTRATE INTRAVENOUS ONCE
Refills: 0 | Status: COMPLETED | OUTPATIENT
Start: 2024-07-10 | End: 2024-07-10

## 2024-07-10 RX ORDER — MAGNESIUM SULFATE 100 %
1 POWDER (GRAM) MISCELLANEOUS ONCE
Refills: 0 | Status: COMPLETED | OUTPATIENT
Start: 2024-07-10 | End: 2024-07-10

## 2024-07-10 RX ADMIN — Medication 100 GRAM(S): at 06:24

## 2024-07-10 RX ADMIN — Medication 650 MILLIGRAM(S): at 05:25

## 2024-07-10 RX ADMIN — Medication 650 MILLIGRAM(S): at 13:38

## 2024-07-10 RX ADMIN — Medication 63.75 MILLIMOLE(S): at 07:59

## 2024-07-10 RX ADMIN — ASPIRIN 81 MILLIGRAM(S): 325 TABLET, FILM COATED ORAL at 13:38

## 2024-07-10 RX ADMIN — ENOXAPARIN SODIUM 40 MILLIGRAM(S): 100 INJECTION SUBCUTANEOUS at 21:57

## 2024-07-10 RX ADMIN — MONTELUKAST SODIUM 10 MILLIGRAM(S): 10 TABLET, FILM COATED ORAL at 13:38

## 2024-07-10 RX ADMIN — ATORVASTATIN CALCIUM 10 MILLIGRAM(S): 20 TABLET, FILM COATED ORAL at 21:57

## 2024-07-10 RX ADMIN — Medication 400 MILLIGRAM(S): at 17:06

## 2024-07-10 RX ADMIN — PIPERACILLIN SODIUM AND TAZOBACTAM SODIUM 25 GRAM(S): 3; .375 INJECTION, POWDER, LYOPHILIZED, FOR SOLUTION INTRAVENOUS at 13:42

## 2024-07-10 RX ADMIN — PIPERACILLIN SODIUM AND TAZOBACTAM SODIUM 25 GRAM(S): 3; .375 INJECTION, POWDER, LYOPHILIZED, FOR SOLUTION INTRAVENOUS at 21:57

## 2024-07-10 RX ADMIN — Medication 400 MILLIGRAM(S): at 05:25

## 2024-07-10 RX ADMIN — DEXTROSE MONOHYDRATE AND SODIUM CHLORIDE 125 MILLILITER(S): 5; .3 INJECTION, SOLUTION INTRAVENOUS at 05:26

## 2024-07-10 RX ADMIN — PIPERACILLIN SODIUM AND TAZOBACTAM SODIUM 25 GRAM(S): 3; .375 INJECTION, POWDER, LYOPHILIZED, FOR SOLUTION INTRAVENOUS at 05:26

## 2024-07-10 RX ADMIN — LOSARTAN POTASSIUM 25 MILLIGRAM(S): 100 TABLET, FILM COATED ORAL at 05:26

## 2024-07-10 RX ADMIN — Medication 650 MILLIGRAM(S): at 17:06

## 2024-07-10 NOTE — PROGRESS NOTE ADULT - SUBJECTIVE AND OBJECTIVE BOX
GENERAL SURGERY PROGRESS NOTE    Patient: RAJIV COLLADO , 69y (55)Female   MRN: [id number]  Location: [address]  Visit: 24 Inpatient  Date: 07-10-24 @ 01:27    Hospital Day #:3  Procedure/Dx/Injuries:  Gallstone pancreatitis    Events of past 24 hours: NO ACUTE EVENTS OVER NIGHT. PATIENT IS ALERT AND RESPONSIVE, NPO ON FLUIDS. THE PAIN HAS IMPROVED. NO FABI, NO BOWEL MOVEMENTS. GI WILL DO ERCP TODAY.    PAST MEDICAL & SURGICAL HISTORY:  High cholesterol  DM (diabetes mellitus)  H/O left mastectomy  Single delivery by  section  History of tonsillectomy    Vitals:   T(F): 98.5 (07-10-24 @ 00:01), Max: 99 (24 @ 15:21)  HR: 70 (07-10-24 @ 00:01)  BP: 139/82 (07-10-24 @ 00:01)  RR: 18 (07-10-24 @ 00:01)  SpO2: 98% (07-10-24 @ 00:01)    Diet, NPO:   Except Medications    Fluids:   I & O's:  Bowel Movement: : [] YES [X] NO  Flatus: : [] YES [X] NO    PHYSICAL EXAM:  General: NAD, AAOx3, calm and cooperative  HEENT: NCAT, JESSE, EOMI, Trachea ML, Neck supple  Cardiac: RRR S1, S2, no Murmurs, rubs or gallops  Respiratory: CTAB, normal respiratory effort, breath sounds equal BL, no wheeze, rhonchi or crackles  Abdomen: Soft, non-distended, non-tender, no rebound, no guarding. +BS.  Rectal: Good tone, +stool, no blood, no kate-anal masses/lesions, no fistulas, fissures, hemorrhoids  Musculoskeletal: Strength 5/5 BL UE/LE, ROM intact, compartments soft  Neuro: Sensation grossly intact and equal throughout, no focal deficits  Vascular: Pulses 2+ throughout, extremities well perfused  Skin: Warm/dry, normal color, no jaundice  Incision/wound: healing well, dressings in place, clean, dry and intact    MEDICATIONS  (STANDING):  acetaminophen     Tablet .. 650 milliGRAM(s) Oral every 6 hours  acyclovir   Oral Tab/Cap 400 milliGRAM(s) Oral two times a day  aspirin  chewable 81 milliGRAM(s) Oral daily  atorvastatin 10 milliGRAM(s) Oral at bedtime  dextrose 50% Injectable 25 Gram(s) IV Push once  enoxaparin Injectable 40 milliGRAM(s) SubCutaneous every 24 hours  insulin lispro (ADMELOG) corrective regimen sliding scale   SubCutaneous every 8 hours  lactated ringers. 1000 milliLiter(s) (125 mL/Hr) IV Continuous <Continuous>  losartan 25 milliGRAM(s) Oral daily  montelukast 10 milliGRAM(s) Oral daily  piperacillin/tazobactam IVPB.. 3.375 Gram(s) IV Intermittent every 8 hours    MEDICATIONS  (PRN):  ketorolac   Injectable 15 milliGRAM(s) IV Push every 6 hours PRN Severe Pain (7 - 10)    DVT PROPHYLAXIS: enoxaparin Injectable 40 milliGRAM(s) SubCutaneous every 24 hours    GI PROPHYLAXIS:   ANTICOAGULATION:   ANTIBIOTICS:  acyclovir   Oral Tab/Cap 400 milliGRAM(s)  piperacillin/tazobactam IVPB.. 3.375 Gram(s)    LAB/STUDIES:  Labs:  CAPILLARY BLOOD GLUCOSE    POCT Blood Glucose.: 96 mg/dL (2024 20:58)  POCT Blood Glucose.: 119 mg/dL (2024 14:01)  POCT Blood Glucose.: 131 mg/dL (2024 11:22)  POCT Blood Glucose.: 135 mg/dL (2024 08:15)                         10.7   3.85  )-----------( 131      ( 2024 22:38 )             33.8             136  |  103  |  7<L>  ----------------------------<  86  3.5   |  22  |  <0.5<L>      Calcium: 8.1 mg/dL (24 @ 22:38)    LFTs:             4.9  | 2.7  | 128      ------------------[332     ( 2024 22:38 )  3.1  | 0.9  | 179         Lipase:x      Amylase:x         Lactate, Blood: 1.2 mmol/L (24 @ 18:10)    Coags:  Urinalysis Basic - ( 2024 22:38 )    Color: x / Appearance: x / SG: x / pH: x  Gluc: 86 mg/dL / Ketone: x  / Bili: x / Urobili: x   Blood: x / Protein: x / Nitrite: x   Leuk Esterase: x / RBC: x / WBC x   Sq Epi: x / Non Sq Epi: x / Bacteria: x    Urinalysis with Rflx Culture (collected 2024 18:20)         GENERAL SURGERY PROGRESS NOTE    Patient: RAJIV COLLADO , 69y (55)Female   MRN: [id number]  Location: [address]  Visit: 24 Inpatient  Date: 07-10-24 @ 01:27    Hospital Day #:3  Procedure/Dx/Injuries:  Gallstone pancreatitis    Events of past 24 hours: NO ACUTE EVENTS OVER NIGHT. PATIENT IS ALERT AND RESPONSIVE, NPO ON FLUIDS. THE PAIN HAS IMPROVED. NO FABI, NO BOWEL MOVEMENTS. GI WILL DO ERCP TODAY.    PAST MEDICAL & SURGICAL HISTORY:  High cholesterol  DM (diabetes mellitus)  H/O left mastectomy  Single delivery by  section  History of tonsillectomy    Vitals:   T(F): 98.5 (07-10-24 @ 00:01), Max: 99 (24 @ 15:21)  HR: 70 (07-10-24 @ 00:01)  BP: 139/82 (07-10-24 @ 00:01)  RR: 18 (07-10-24 @ 00:01)  SpO2: 98% (07-10-24 @ 00:01)    Diet, NPO:   Except Medications    Fluids:   I & O's:  Bowel Movement: : [] YES [X] NO  Flatus: : [] YES [X] NO    PHYSICAL EXAM:  General: NAD, AAOx3, calm and cooperative  HEENT: NCAT, JESSE, EOMI, Trachea ML, Neck supple  Cardiac: RRR S1, S2, no Murmurs, rubs or gallops  Respiratory: CTAB, normal respiratory effort, breath sounds equal BL, no wheeze, rhonchi or crackles  Abdomen: Soft, non-distended, non-tender, no rebound, no guarding. +BS.  Rectal: Good tone, +stool, no blood, no kate-anal masses/lesions, no fistulas, fissures, hemorrhoids  Musculoskeletal: Strength 5/5 BL UE/LE, ROM intact, compartments soft  Neuro: Sensation grossly intact and equal throughout, no focal deficits  Vascular: Pulses 2+ throughout, extremities well perfused  Skin: Warm/dry, normal color, no jaundice  Incision/wound: healing well, dressings in place, clean, dry and intact    MEDICATIONS  (STANDING):  acetaminophen     Tablet .. 650 milliGRAM(s) Oral every 6 hours  acyclovir   Oral Tab/Cap 400 milliGRAM(s) Oral two times a day  aspirin  chewable 81 milliGRAM(s) Oral daily  atorvastatin 10 milliGRAM(s) Oral at bedtime  dextrose 50% Injectable 25 Gram(s) IV Push once  enoxaparin Injectable 40 milliGRAM(s) SubCutaneous every 24 hours  insulin lispro (ADMELOG) corrective regimen sliding scale   SubCutaneous every 8 hours  lactated ringers. 1000 milliLiter(s) (125 mL/Hr) IV Continuous <Continuous>  losartan 25 milliGRAM(s) Oral daily  montelukast 10 milliGRAM(s) Oral daily  piperacillin/tazobactam IVPB.. 3.375 Gram(s) IV Intermittent every 8 hours    MEDICATIONS  (PRN):  ketorolac   Injectable 15 milliGRAM(s) IV Push every 6 hours PRN Severe Pain (7 - 10)    DVT PROPHYLAXIS: enoxaparin Injectable 40 milliGRAM(s) SubCutaneous every 24 hours    GI PROPHYLAXIS:   ANTICOAGULATION:   ANTIBIOTICS:  acyclovir   Oral Tab/Cap 400 milliGRAM(s)  piperacillin/tazobactam IVPB.. 3.375 Gram(s)    LAB/STUDIES:  CAPILLARY BLOOD GLUCOSE  POCT Blood Glucose.: 96 mg/dL (2024 20:58)  POCT Blood Glucose.: 119 mg/dL (2024 14:01)  POCT Blood Glucose.: 131 mg/dL (2024 11:22)  POCT Blood Glucose.: 135 mg/dL (2024 08:15)                       10.7   3.85  )-----------( 131      ( 2024 22:38 )             33.8         136  |  103  |  7<L>  ----------------------------<  86  3.5   |  22  |  <0.5<L>  Calcium: 8.1 mg/dL (24 @ 22:38)    LFTs:             4.9  | 2.7  | 128      ------------------[332     ( 2024 22:38 )  3.1  | 0.9  | 179         Lactate, Blood: 1.2 mmol/L (24 @ 18:10)    Urinalysis Basic - ( 2024 22:38 )  Color: x / Appearance: x / SG: x / pH: x  Gluc: 86 mg/dL / Ketone: x  / Bili: x / Urobili: x   Blood: x / Protein: x / Nitrite: x   Leuk Esterase: x / RBC: x / WBC x   Sq Epi: x / Non Sq Epi: x / Bacteria: x  Urinalysis with Rflx Culture (collected 2024 18:20)

## 2024-07-10 NOTE — PROGRESS NOTE ADULT - ASSESSMENT
ASSESSMENT:  69F with PMH left breast cancer s/p left breast mastectomy, open appendectomy,  x2, HTN, HLD, DM2, multiple myeloma on chemotherapy for last 2 months (4 months total)- last chemo 2 weeks prior, now 1 week post-op s/p cholecystectomy for cholecystitis and gallstone pancreatitis presents with epigastric abdominal pain, abdominal distention, nausea and multiple episodes of NBNB vomiting x 2 days. GI evaluated her and will perform an ERCP today, she is NPO since midnight. NO significant events over night, she was afebrile. hemodynamically stable. No bowel movements or gas today.    PLAN:  - supportive care  - Continue pain management  - Continue DVT ppx  - Continue IV Abx Zosyn  - NPO on IV at rate of 125 cc/hr  - Encourage ambulation  - Monitor vitals  - Trend labs and replete as necessary     Trauma [id number]

## 2024-07-11 ENCOUNTER — TRANSCRIPTION ENCOUNTER (OUTPATIENT)
Age: 69
End: 2024-07-11

## 2024-07-11 VITALS
SYSTOLIC BLOOD PRESSURE: 131 MMHG | OXYGEN SATURATION: 96 % | HEART RATE: 66 BPM | DIASTOLIC BLOOD PRESSURE: 78 MMHG | TEMPERATURE: 99 F | RESPIRATION RATE: 18 BRPM

## 2024-07-11 PROCEDURE — 99232 SBSQ HOSP IP/OBS MODERATE 35: CPT | Mod: 24,25

## 2024-07-11 PROCEDURE — 99232 SBSQ HOSP IP/OBS MODERATE 35: CPT

## 2024-07-11 RX ORDER — ACETAMINOPHEN 325 MG
2 TABLET ORAL
Qty: 0 | Refills: 0 | DISCHARGE
Start: 2024-07-11

## 2024-07-11 RX ADMIN — Medication 650 MILLIGRAM(S): at 05:42

## 2024-07-11 RX ADMIN — PIPERACILLIN SODIUM AND TAZOBACTAM SODIUM 25 GRAM(S): 3; .375 INJECTION, POWDER, LYOPHILIZED, FOR SOLUTION INTRAVENOUS at 05:41

## 2024-07-11 RX ADMIN — Medication 400 MILLIGRAM(S): at 05:42

## 2024-07-11 RX ADMIN — LOSARTAN POTASSIUM 25 MILLIGRAM(S): 100 TABLET, FILM COATED ORAL at 05:42

## 2024-07-11 RX ADMIN — POTASSIUM PHOSPHATE, MONOBASIC POTASSIUM PHOSPHATE, DIBASIC INJECTION, 63.75 MILLIMOLE(S): 236; 224 SOLUTION, CONCENTRATE INTRAVENOUS at 05:41

## 2024-07-11 RX ADMIN — Medication 650 MILLIGRAM(S): at 12:23

## 2024-07-11 NOTE — PROGRESS NOTE ADULT - ASSESSMENT
known to [facility] for m myeloma   currently on chemo   readmitted for pancreatitis   stable now and will be discharged today  [facility] follow up in 1 week     diomedes reno MD

## 2024-07-11 NOTE — DISCHARGE NOTE NURSING/CASE MANAGEMENT/SOCIAL WORK - PATIENT PORTAL LINK FT
You can access the FollowMyHealth Patient Portal offered by [facility] by registering at the following website: [email or web address]. By joining [facility]’s FollowMyHealth portal, you will also be able to view your health information using other applications (apps) compatible with our system.

## 2024-07-11 NOTE — PROGRESS NOTE ADULT - ASSESSMENT
ASSESSMENT:  69F with PMH left breast cancer s/p left breast mastectomy, open appendectomy,  x2, HTN, HLD, DM2, multiple myeloma on chemotherapy for last 2 months (4 months total)- last chemo 2 weeks prior, now 1 week post-op s/p cholecystectomy for cholecystitis and gallstone pancreatitis presents with epigastric abdominal pain, abdominal distention, nausea and multiple episodes of NBNB vomiting x 2 days. GI evaluated her and will perform an ERCP today, she is NPO since midnight. NO significant events over night, she was afebrile. hemodynamically stable. No bowel movements or gas today.    PLAN:  - supportive care  - Continue pain management  - Continue DVT ppx  - Continue IV Abx Zosyn  - NPO on IV at rate of 125 cc/hr  - Encourage ambulation  - Monitor vitals  - Trend labs and replete as necessary     Trauma [id number]   ASSESSMENT:  69F with PMH left breast cancer s/p left breast mastectomy, open appendectomy,  x2, HTN, HLD, DM2, multiple myeloma on chemotherapy for last 2 months (4 months total)- last chemo 2 weeks prior, now 1 week post-op s/p cholecystectomy for cholecystitis and gallstone pancreatitis presents with epigastric abdominal pain, abdominal distention, nausea and multiple episodes of NBNB vomiting x 2 days. She is s/p ERCP. NO significant events over night, she was afebrile. hemodynamically stable. No bowel movements today, + flatus    PLAN:  - F/u ERCP report  - supportive care  - Continue pain management  - Continue DVT ppx  - Continue IV Abx Zosyn  - NPO on IV at rate of 125 cc/hr  - Encourage ambulation  - Monitor vitals  - Trend labs and replete as necessary     Trauma [id number]   ASSESSMENT:  69F with PMH left breast cancer s/p left breast mastectomy, open appendectomy,  x2, HTN, HLD, DM2, multiple myeloma on chemotherapy for last 2 months (4 months total)- last chemo 2 weeks prior, now 1 week post-op s/p cholecystectomy for cholecystitis and gallstone pancreatitis presents with epigastric abdominal pain, abdominal distention, nausea and multiple episodes of NBNB vomiting x 2 days. She is s/p ERCP. NO significant events over night, she was afebrile. hemodynamically stable. No bowel movements today, + flatus    PLAN:  - F/u ERCP report  - CLD, consider advancing today  - Supportive care  - Continue pain management  - Continue DVT ppx  - Continue IV Abx Zosyn  - NPO on IV at rate of 125 cc/hr  - Encourage ambulation  - Monitor vitals  - Trend labs and replete as necessary     Trauma [id number]

## 2024-07-11 NOTE — PROGRESS NOTE ADULT - ASSESSMENT
69F with PMH left breast cancer s/p left breast mastectomy, open appendectomy,  x2, HTN, HLD, DM2, multiple myeloma on chemotherapy for last 2 months (4 months total)- last chemo 2 weeks prior, now 1 week post-op s/p cholecystectomy for cholecystitis and gallstone pancreatitis presents with epigastric abdominal pain, abdominal distention, nausea and multiple episodes of NBNB vomiting x 2 days. Patient states that she had acute onset of epigastric abdominal pain last night with associated chills, nausea and vomiting x4 which began this morning. She has been unable to tolerate PO intake. Last bowel movement was this morning. GI was consulted for pancreatitis.     Acute pancreatitis likely gallstone   S/P cholecystectomy.  Collection within the gallbladder fossa   Hepatic hypodensity  Small pelvic ascites    Liver cirrhosis   CBD 8 mm   s/p ERCP   No events overnight     PLAN   advanced diet   Hepatology evaluation, will need MR liver and workup for cirrhosis   Follow up with [facility] (Dr Wolfe)  in two weeks after discharge

## 2024-07-11 NOTE — DISCHARGE NOTE PROVIDER - HOSPITAL COURSE
69F with PMH left breast cancer s/p left breast mastectomy, open appendectomy,  x2, HTN, HLD, DM2, multiple myeloma on chemotherapy for last 2 months (4 months total)- last chemo 2 weeks prior, now 1 week post-op s/p cholecystectomy for cholecystitis and gallstone pancreatitis presents with epigastric abdominal pain, abdominal distention, nausea and multiple episodes of NBNB vomiting x 2 days. Patient states that she had acute onset of epigastric abdominal pain last night with associated chills, nausea and vomiting x4 which began this morning. She has been unable to tolerate PO intake. Last bowel movement was this morning. The patient was found to have gallstone pancreatitis with elevated T bili. GI was consulted and the patient underwent an ERCP on 7/10, sphincterotomy was performed, sludge removed. Post procedure the patient tolerated diet, LFTs downtrended. Staples removed from prior trochar site. The patient will be discharged to home with instructions to follow up with Dr. Lo in the office in 1-2 weeks.

## 2024-07-11 NOTE — DISCHARGE NOTE PROVIDER - NSDCFUSCHEDAPPT_GEN_ALL_CORE_FT
[facility] Physician [facility] [address]  Scheduled Appointment: 07/17/2024

## 2024-07-11 NOTE — PROGRESS NOTE ADULT - ATTENDING COMMENTS
ACS Attending  Note Attestation    Patient is examined and evaluated at the bedside with the residents/PAs. Treatment plan discussed with the team, nurses, and consulting physicians and consulting teams. Medications, radiological studies and all other relevant studies reviewed.     RAJIV COLLADO Patient is a 69y old  Female who presents with a chief complaint of gallstone pancreatitis S/P robotic cholecystectomy last week.    Abdomen soft, obese, not distended, not tender, no rebound or guarding    Vital Signs Last 24 Hrs  T(C): 36.8 (10 Jul 2024 14:13), Max: 37.2 (09 Jul 2024 15:21)  T(F): 98.3 (10 Jul 2024 14:13), Max: 99 (09 Jul 2024 15:21)  HR: 62 (10 Jul 2024 14:13) (62 - 92)  BP: 131/71 (10 Jul 2024 14:13) (123/70 - 148/72)  BP(mean): --  RR: 18 (10 Jul 2024 14:13) (18 - 18)  SpO2: 97% (10 Jul 2024 14:13) (94% - 98%)    Parameters below as of 10 Jul 2024 13:00  Patient On (Oxygen Delivery Method): room air                            10.7   3.85  )-----------( 131      ( 09 Jul 2024 22:38 )             33.8   07-09    136  |  103  |  7<L>  ----------------------------<  86  3.5   |  22  |  <0.5<L>    Ca    8.1<L>      09 Jul 2024 22:38  Phos  1.4     07-09  Mg     1.7     07-09    TPro  4.9<L>  /  Alb  3.1<L>  /  TBili  2.7<H>  /  DBili  0.9<H>  /  AST  128<H>  /  ALT  179<H>  /  AlkPhos  332<H>  07-09    Diagnosis: Gall stone pancreatitis                  Elevated LFTs                  Multiple myeloma                  Leukopenia     Plan:	  - supportive care  - GI/DVT prophylaxis  - pain management  - NPO  - IV fluids  - Gi consult for EUS/ERCP, plan for today  - IV ABx  - incentive spirometer    - follow up consults  - repeat studies as needed  - replace electrolytes  - case management evaluation     Jeannette Candelario MD, FACS  Trauma/ACS/Surgical Critical Care Attending
ACS Attending  Note Attestation    Patient is examined and evaluated at the bedside with the residents/PAs. Treatment plan discussed with the team, nurses, and consulting physicians and consulting teams. Medications, radiological studies and all other relevant studies reviewed.     RAJIV COLLADO Patient is a 69y old  Female who presents with a chief complaint of gallstone pancreatitis S/P robotic cholecystectomy last week.    Abdomen soft, obese, not distended, not tender, no rebound or guarding    Vital Signs Last 24 Hrs  T(C): 36.6 (09 Jul 2024 16:20), Max: 37.5 (08 Jul 2024 16:50)  T(F): 97.8 (09 Jul 2024 16:20), Max: 99.5 (08 Jul 2024 16:50)  HR: 71 (09 Jul 2024 16:20) (70 - 84)  BP: 125/76 (09 Jul 2024 16:20) (114/72 - 144/78)  BP(mean): --  RR: 18 (09 Jul 2024 16:20) (18 - 18)  SpO2: 98% (09 Jul 2024 16:20) (95% - 98%)    Parameters below as of 09 Jul 2024 15:21  Patient On (Oxygen Delivery Method): room air                12.1   3.64  )-----------( 178      ( 08 Jul 2024 18:10 )             36.4     07-09    139  |  104  |  7<L>  ----------------------------<  130<H>  3.5   |  24  |  <0.5<L>    Ca    8.3<L>      09 Jul 2024 11:54    TPro  5.3<L>  /  Alb  3.4<L>  /  TBili  3.2<H>  /  DBili  1.5<H>  /  AST  176<H>  /  ALT  210<H>  /  AlkPhos  379<H>  07-09      Diagnosis: Gall stone pancreatitis                  Elevated LFTs                  Multiple myeloma                  Leukopenia     Plan:	  - supportive care  - GI/DVT prophylaxis  - pain management  - NPO  - IV fluids  - Gi consult for EUS/ERCP  - IV ABx  - incentive spirometer    - follow up consults  - repeat studies as needed  - replace electrolytes  - case management evaluation     Jeannette Candelario MD, FACS  Trauma/ACS/Surgical Critical Care Attending
ACS Attending  Note Attestation    Patient is examined and evaluated at the bedside with the residents/PAs. Treatment plan discussed with the team, nurses, and consulting physicians and consulting teams. Medications, radiological studies and all other relevant studies reviewed.     RAJIV COLLADO Patient is a 69y old  Female who presents with a chief complaint of gallstone pancreatitis S/P robotic cholecystectomy last week.    Abdomen soft, obese, not distended, not tender, no rebound or guarding  ERCP demonstrated sludge which successfully removed     Vital Signs Last 24 Hrs  T(C): 37 (11 Jul 2024 07:49), Max: 37 (11 Jul 2024 07:49)  T(F): 98.6 (11 Jul 2024 07:49), Max: 98.6 (11 Jul 2024 07:49)  HR: 66 (11 Jul 2024 07:49) (55 - 66)  BP: 131/78 (11 Jul 2024 07:49) (106/71 - 131/79)  BP(mean): --  RR: 18 (11 Jul 2024 07:49) (18 - 18)  SpO2: 96% (11 Jul 2024 07:49) (96% - 98%)                            11.4   4.25  )-----------( 160      ( 10 Jul 2024 20:00 )             34.5   07-10    139  |  104  |  7<L>  ----------------------------<  163<H>  3.9   |  22  |  <0.5<L>    Ca    8.0<L>      10 Jul 2024 20:00  Phos  1.7     07-10  Mg     2.0     07-10    TPro  5.0<L>  /  Alb  3.4<L>  /  TBili  1.6<H>  /  DBili  0.6<H>  /  AST  55<H>  /  ALT  131<H>  /  AlkPhos  341<H>  07-10    Diagnosis: Gall stone pancreatitis                  Elevated LFTs                  Multiple myeloma                  Leukopenia     Plan:	  - supportive care  - GI/DVT prophylaxis  - pain management  - advance diet as tolerated  - IV fluids  - S/P EUS/ERCP   - IV ABx to be D/C  - incentive spirometer    - follow up consults  - repeat studies as needed  - replace electrolytes  - case management evaluation     Jeannette Candelario MD, FACS  Trauma/ACS/Surgical Critical Care Attending

## 2024-07-11 NOTE — DISCHARGE NOTE PROVIDER - NSDCCPCAREPLAN_GEN_ALL_CORE_FT
PRINCIPAL DISCHARGE DIAGNOSIS  Diagnosis: Pancreatitis  Assessment and Plan of Treatment: S/P ERCP with sphincterotomy and slude removal.  Continue low fat diet  Pacreatitis resolved.   Follow up with Dr. Lo in the office in 1-2 weeks.   Continue pain regimen as per prior.  Avoid heavy lifting for 4-6 weeks.      SECONDARY DISCHARGE DIAGNOSES  Diagnosis: Transaminitis  Assessment and Plan of Treatment:     Diagnosis: Gallstones  Assessment and Plan of Treatment:

## 2024-07-11 NOTE — DISCHARGE NOTE PROVIDER - NSDCMRMEDTOKEN_GEN_ALL_CORE_FT
acetaminophen 325 mg oral tablet: 2 tab(s) orally every 6 hours  acyclovir 400 mg oral tablet: 1 tab(s) orally 2 times a day  aspirin 81 mg oral capsule: 1 cap(s) orally once a day  atorvastatin 10 mg oral tablet: 1 tab(s) orally once a day (at bedtime)  glipiZIDE 5 mg oral tablet: 1 tab(s) orally once a day  Hemady 20 mg oral tablet: 1 tab(s) orally every 7 days  Janumet XR 50 mg-1000 mg oral tablet, extended release: 2 tab(s) orally 2 times a day  losartan 25 mg oral tablet: 1 tab(s) orally once a day  montelukast 10 mg oral tablet: 1 tab(s) orally once a day  Revlimid 15 mg oral capsule: 1 cap(s) orally once a day

## 2024-07-11 NOTE — PROGRESS NOTE ADULT - SUBJECTIVE AND OBJECTIVE BOX
GENERAL SURGERY PROGRESS NOTE    Patient: RAJIV COLLADO , 69y (55)Female   MRN: [id number]  Location: [address]  Visit: 24 Inpatient  Date: 24 @ 02:50    Hospital Day #: 4  Post-Op Day #: 10    Procedure/Dx/Injuries: Gallstone pancreatitis    Events of past 24 hours: NAEO, OOBTC. No pain, nausea, vomiting or bowel movvements. Passing gas.    PAST MEDICAL & SURGICAL HISTORY:  High cholesterol      DM (diabetes mellitus)      H/O left mastectomy      Single delivery by  section      History of tonsillectomy          Vitals:   T(F): 96.9 (24 @ 00:16), Max: 98.9 (07-10-24 @ 07:44)  HR: 57 (24 @ 00:16)  BP: 131/79 (24 @ 00:16)  RR: 18 (24 @ 00:16)  SpO2: 96% (24 @ 00:16)      Diet, Clear Liquid      Fluids:     I & O's:    24 @ 07:01  -  07-10-24 @ 07:00  --------------------------------------------------------  IN:      OUT:  Total OUT: 0 mL    Total NET: 2125 mL        Bowel Movement: : [] YES [] NO  Flatus: : [] YES [] NO    PHYSICAL EXAM:  General: NAD, AAOx3, calm and cooperative  HEENT: NCAT, JESSE, EOMI, Trachea ML, Neck supple  Cardiac: RRR S1, S2, no Murmurs, rubs or gallops  Respiratory: CTAB, normal respiratory effort, breath sounds equal BL, no wheeze, rhonchi or crackles  Abdomen: Soft, non-distended, non-tender, no rebound, no guarding. +BS.  Rectal: Good tone, +stool, no blood, no kate-anal masses/lesions, no fistulas, fissures, hemorrhoids  Musculoskeletal: Strength 5/5 BL UE/LE, ROM intact, compartments soft  Neuro: Sensation grossly intact and equal throughout, no focal deficits  Vascular: Pulses 2+ throughout, extremities well perfused  Skin: Warm/dry, normal color, no jaundice  Incision/wound: healing well, dressings in place, clean, dry and intact  Lactated Ringers: 2125 mL    Total IN: 2125 mL    MEDICATIONS  (STANDING):  acetaminophen     Tablet .. 650 milliGRAM(s) Oral every 6 hours  acyclovir   Oral Tab/Cap 400 milliGRAM(s) Oral two times a day  aspirin  chewable 81 milliGRAM(s) Oral daily  atorvastatin 10 milliGRAM(s) Oral at bedtime  dextrose 50% Injectable 25 Gram(s) IV Push once  enoxaparin Injectable 40 milliGRAM(s) SubCutaneous every 24 hours  insulin lispro (ADMELOG) corrective regimen sliding scale   SubCutaneous every 8 hours  lactated ringers. 1000 milliLiter(s) (125 mL/Hr) IV Continuous <Continuous>  losartan 25 milliGRAM(s) Oral daily  montelukast 10 milliGRAM(s) Oral daily  piperacillin/tazobactam IVPB.. 3.375 Gram(s) IV Intermittent every 8 hours  potassium phosphate IVPB 15 milliMole(s) IV Intermittent once    MEDICATIONS  (PRN):  ketorolac   Injectable 15 milliGRAM(s) IV Push every 6 hours PRN Severe Pain (7 - 10)      DVT PROPHYLAXIS: enoxaparin Injectable 40 milliGRAM(s) SubCutaneous every 24 hours    GI PROPHYLAXIS:   ANTICOAGULATION:   ANTIBIOTICS:  acyclovir   Oral Tab/Cap 400 milliGRAM(s)  piperacillin/tazobactam IVPB.. 3.375 Gram(s)            LAB/STUDIES:  Labs:  CAPILLARY BLOOD GLUCOSE      POCT Blood Glucose.: 150 mg/dL (10 Jul 2024 22:08)  POCT Blood Glucose.: 235 mg/dL (10 Jul 2024 18:10)  POCT Blood Glucose.: 90 mg/dL (10 Jul 2024 10:26)  POCT Blood Glucose.: 92 mg/dL (10 Jul 2024 05:35)                          11.4   4.25  )-----------( 160      ( 10 Jul 2024 20:00 )             34.5       Auto Neutrophil %: 77.4 % (07-10-24 @ 20:00)  Auto Immature Granulocyte %: 0.7 % (07-10-24 @ 20:00)    07-10    139  |  104  |  7<L>  ----------------------------<  163<H>  3.9   |  22  |  <0.5<L>      Calcium: 8.0 mg/dL (07-10-24 @ 20:00)      LFTs:             5.0  | 1.6  | 55       ------------------[341     ( 10 Jul 2024 20:00 )  3.4  | 0.6  | 131         Lipase:x      Amylase:x         Lactate, Blood: 1.2 mmol/L (24 @ 18:10)      Coags:            Urinalysis Basic - ( 10 Jul 2024 20:00 )    Color: x / Appearance: x / SG: x / pH: x  Gluc: 163 mg/dL / Ketone: x  / Bili: x / Urobili: x   Blood: x / Protein: x / Nitrite: x   Leuk Esterase: x / RBC: x / WBC x   Sq Epi: x / Non Sq Epi: x / Bacteria: x        Urinalysis with Rflx Culture (collected 2024 18:20)              IMAGING:               GENERAL SURGERY PROGRESS NOTE    Patient: RAJIV COLLADO , 69y (55)Female   MRN: [id number]  Location: [address]  Visit: 24 Inpatient  Date: 24 @ 02:50    Hospital Day #: 4  Post-Op Day #: 10    Procedure/Dx/Injuries: Gallstone pancreatitis    Events of past 24 hours:   No acute events overnight. Pt has been seen and examen at the bedside. Staples were taken off this AM. Steri-strips over the incision sites. Pt in no acute distress, jeanne and cooperative. Pt is ambulating. Pt went to ERCP yesterday afternoon. Will f/u with results this morning.    PAST MEDICAL & SURGICAL HISTORY:  High cholesterol  DM (diabetes mellitus)  H/O left mastectomy  Single delivery by  section  History of tonsillectomy    Vitals:   T(F): 96.9 (24 @ 00:16), Max: 98.9 (07-10-24 @ 07:44)  HR: 57 (24 @ 00:16)  BP: 131/79 (24 @ 00:16)  RR: 18 (24 @ 00:16)  SpO2: 96% (24 @ 00:16)    Diet, Clear Liquid    Fluids:     I & O's:    24 @ 07:01  -  07-10-24 @ 07:00  --------------------------------------------------------  IN:    OUT:  Total OUT: 0 mL    Total NET: 2125 mL    Bowel Movement: : [] YES [x] NO  Flatus: : [x] YES [] NO    PHYSICAL EXAM:  General: NAD, AAOx3, calm and cooperative  HEENT: NCAT, EOMI, Trachea ML, Neck supple  Cardiac: RRR, no Murmurs, rubs or gallops  Respiratory: Normal respiratory effort, no accessory muscle use  Abdomen: Soft, non-distended, non-tender  Musculoskeletal: Strength 5/5 BL UE/LE, ROM intact  Skin: Warm/dry, normal color, no jaundice  Incision/wound: healing well, dressings in place, clean, dry and intact  Lactated Ringers: 125/h    Total IN: 2125 mL    MEDICATIONS  (STANDING):  acetaminophen     Tablet .. 650 milliGRAM(s) Oral every 6 hours  acyclovir   Oral Tab/Cap 400 milliGRAM(s) Oral two times a day  aspirin  chewable 81 milliGRAM(s) Oral daily  atorvastatin 10 milliGRAM(s) Oral at bedtime  dextrose 50% Injectable 25 Gram(s) IV Push once  enoxaparin Injectable 40 milliGRAM(s) SubCutaneous every 24 hours  insulin lispro (ADMELOG) corrective regimen sliding scale   SubCutaneous every 8 hours  lactated ringers. 1000 milliLiter(s) (125 mL/Hr) IV Continuous <Continuous>  losartan 25 milliGRAM(s) Oral daily  montelukast 10 milliGRAM(s) Oral daily  piperacillin/tazobactam IVPB.. 3.375 Gram(s) IV Intermittent every 8 hours  potassium phosphate IVPB 15 milliMole(s) IV Intermittent once    MEDICATIONS  (PRN):  ketorolac   Injectable 15 milliGRAM(s) IV Push every 6 hours PRN Severe Pain (7 - 10)      DVT PROPHYLAXIS: enoxaparin Injectable 40 milliGRAM(s) SubCutaneous every 24 hours    GI PROPHYLAXIS:   ANTICOAGULATION:   ANTIBIOTICS:  acyclovir   Oral Tab/Cap 400 milliGRAM(s)  piperacillin/tazobactam IVPB.. 3.375 Gram(s)    LAB/STUDIES:  Labs:  CAPILLARY BLOOD GLUCOSE    POCT Blood Glucose.: 150 mg/dL (10 Jul 2024 22:08)  POCT Blood Glucose.: 235 mg/dL (10 Jul 2024 18:10)  POCT Blood Glucose.: 90 mg/dL (10 Jul 2024 10:26)  POCT Blood Glucose.: 92 mg/dL (10 Jul 2024 05:35)                        11.4   4.25  )-----------( 160      ( 10 Jul 2024 20:00 )             34.5       Auto Neutrophil %: 77.4 % (07-10-24 @ 20:00)  Auto Immature Granulocyte %: 0.7 % (07-10-24 @ 20:00)    07-10    139  |  104  |  7<L>  ----------------------------<  163<H>  3.9   |  22  |  <0.5<L>      Calcium: 8.0 mg/dL (07-10-24 @ 20:00)      LFTs:             5.0  | 1.6  | 55       ------------------[341     ( 10 Jul 2024 20:00 )  3.4  | 0.6  | 131         Lipase:x      Amylase:x         Lactate, Blood: 1.2 mmol/L (24 @ 18:10)  Urinalysis with Rflx Culture (collected 2024 18:20)

## 2024-07-11 NOTE — DISCHARGE NOTE PROVIDER - CARE PROVIDER_API CALL
Cyril Lo J  Surgery  [address] [place] [id number]  Phone: [phone]  Fax: [phone]  Follow Up Time: 1 week

## 2024-07-11 NOTE — PROGRESS NOTE ADULT - SUBJECTIVE AND OBJECTIVE BOX
Gastroenterology progress note:     Patient is a 69y old  Female who presents with a chief complaint of gallstone pancreatitis (2024 09:23)       Admitted on: 24    We are following the patient for s/p ERCP     denies pain tolerating clears     PAST MEDICAL & SURGICAL HISTORY:  High cholesterol      DM (diabetes mellitus)      H/O left mastectomy      Single delivery by  section      History of tonsillectomy          MEDICATIONS  (STANDING):  acetaminophen     Tablet .. 650 milliGRAM(s) Oral every 6 hours  acyclovir   Oral Tab/Cap 400 milliGRAM(s) Oral two times a day  aspirin  chewable 81 milliGRAM(s) Oral daily  atorvastatin 10 milliGRAM(s) Oral at bedtime  dextrose 50% Injectable 25 Gram(s) IV Push once  enoxaparin Injectable 40 milliGRAM(s) SubCutaneous every 24 hours  insulin lispro (ADMELOG) corrective regimen sliding scale   SubCutaneous every 8 hours  losartan 25 milliGRAM(s) Oral daily  montelukast 10 milliGRAM(s) Oral daily    MEDICATIONS  (PRN):  ketorolac   Injectable 15 milliGRAM(s) IV Push every 6 hours PRN Severe Pain (7 - 10)      Allergies  [This allergen will not trigger allergy alert] swordfish (Swelling)  No Known Drug Allergies      Review of Systems:   Cardiovascular:  No Chest Pain, No Palpitations  Respiratory:  No Cough, No Dyspnea  Gastrointestinal:  As described in HPI  Skin:  No Skin Lesions, No Jaundice  Neuro:  No Syncope, No Dizziness    Physical Examination:  T(C): 37 (24 @ 07:49), Max: 37 (24 @ 07:49)  HR: 66 (24 @ 07:49) (55 - 92)  BP: 131/78 (24 @ 07:49) (106/71 - 147/69)  RR: 18 (24 @ 07:49) (18 - 18)  SpO2: 96% (24 @ 07:49) (94% - 98%)      07-10-24 @ 07:01  -  24 @ 07:00  --------------------------------------------------------  IN: 1375 mL / OUT: 0 mL / NET: 1375 mL        GENERAL: AAOx3, no acute distress.  HEAD:  Atraumatic, Normocephalic  EYES: conjunctiva and sclera clear  NECK: Supple, no JVD or thyromegaly  CHEST/LUNG: Clear to auscultation bilaterally; No wheeze, rhonchi, or rales  HEART: Regular rate and rhythm; normal S1, S2, No murmurs.  ABDOMEN: Soft, nontender, nondistended; Bowel sounds present  NEUROLOGY: No asterixis or tremor.   SKIN: Intact, no jaundice     Data:                        11.4   4.25  )-----------( 160      ( 10 Jul 2024 20:00 )             34.5     Hgb trend:  11.4  07-10-24 @ 20:00  10.7  24 @ 22:38  12.1  24 @ 18:10        07-10    139  |  104  |  7<L>  ----------------------------<  163<H>  3.9   |  22  |  <0.5<L>    Ca    8.0<L>      10 Jul 2024 20:00  Phos  1.7     07-10  Mg     2.0     07-10    TPro  5.0<L>  /  Alb  3.4<L>  /  TBili  1.6<H>  /  DBili  0.6<H>  /  AST  55<H>  /  ALT  131<H>  /  AlkPhos  341<H>  07-10    Liver panel trend:  TBili 1.6   /   AST 55   /      /   AlkP 341   /   Tptn 5.0   /   Alb 3.4    /   DBili 0.6      07-10  TBili 2.7   /      /      /   AlkP 332   /   Tptn 4.9   /   Alb 3.1    /   DBili 0.9        TBili 3.2   /      /      /   AlkP 379   /   Tptn 5.3   /   Alb 3.4    /   DBili 1.5        TBili 4.1   /      /      /   AlkP 416   /   Tptn 5.8   /   Alb 4.0    /   DBili 2.4        TBili 1.4   /   AST 33   /   ALT 40   /   AlkP 105   /   Tptn 5.4   /   Alb 3.6    /   DBili 0.3                Urinalysis with Rflx Culture (collected 2024 18:20)         Radiology:

## 2024-07-17 ENCOUNTER — APPOINTMENT (OUTPATIENT)
Dept: SURGERY | Facility: CLINIC | Age: 69
End: 2024-07-17

## 2024-07-19 DIAGNOSIS — I10 ESSENTIAL (PRIMARY) HYPERTENSION: ICD-10-CM

## 2024-07-19 DIAGNOSIS — E83.42 HYPOMAGNESEMIA: ICD-10-CM

## 2024-07-19 DIAGNOSIS — Z90.12 ACQUIRED ABSENCE OF LEFT BREAST AND NIPPLE: ICD-10-CM

## 2024-07-19 DIAGNOSIS — D84.9 IMMUNODEFICIENCY, UNSPECIFIED: ICD-10-CM

## 2024-07-19 DIAGNOSIS — K91.86 RETAINED CHOLELITHIASIS FOLLOWING CHOLECYSTECTOMY: ICD-10-CM

## 2024-07-19 DIAGNOSIS — Z79.52 LONG TERM (CURRENT) USE OF SYSTEMIC STEROIDS: ICD-10-CM

## 2024-07-19 DIAGNOSIS — Z79.82 LONG TERM (CURRENT) USE OF ASPIRIN: ICD-10-CM

## 2024-07-19 DIAGNOSIS — E11.9 TYPE 2 DIABETES MELLITUS WITHOUT COMPLICATIONS: ICD-10-CM

## 2024-07-19 DIAGNOSIS — Z79.84 LONG TERM (CURRENT) USE OF ORAL HYPOGLYCEMIC DRUGS: ICD-10-CM

## 2024-07-19 DIAGNOSIS — K74.60 UNSPECIFIED CIRRHOSIS OF LIVER: ICD-10-CM

## 2024-07-19 DIAGNOSIS — K85.10 BILIARY ACUTE PANCREATITIS WITHOUT NECROSIS OR INFECTION: ICD-10-CM

## 2024-07-19 DIAGNOSIS — Z91.013 ALLERGY TO SEAFOOD: ICD-10-CM

## 2024-07-19 DIAGNOSIS — E78.00 PURE HYPERCHOLESTEROLEMIA, UNSPECIFIED: ICD-10-CM

## 2024-07-19 DIAGNOSIS — Z79.61 LONG TERM (CURRENT) USE OF IMMUNOMODULATOR: ICD-10-CM

## 2024-07-19 DIAGNOSIS — C90.00 MULTIPLE MYELOMA NOT HAVING ACHIEVED REMISSION: ICD-10-CM

## 2024-07-19 DIAGNOSIS — R18.8 OTHER ASCITES: ICD-10-CM

## 2024-07-19 DIAGNOSIS — Z85.3 PERSONAL HISTORY OF MALIGNANT NEOPLASM OF BREAST: ICD-10-CM

## 2024-07-19 LAB — HUMAN ERYTHROCYTE ANTIGEN PANEL RESULT: SIGNIFICANT CHANGE UP

## 2024-08-30 ENCOUNTER — APPOINTMENT (OUTPATIENT)
Dept: GASTROENTEROLOGY | Facility: CLINIC | Age: 69
End: 2024-08-30

## 2025-07-09 ENCOUNTER — OUTPATIENT (OUTPATIENT)
Dept: OUTPATIENT SERVICES | Facility: HOSPITAL | Age: 70
LOS: 1 days | Discharge: HOME | End: 2025-07-09
Payer: MEDICARE

## 2025-07-09 VITALS
DIASTOLIC BLOOD PRESSURE: 81 MMHG | HEIGHT: 64 IN | SYSTOLIC BLOOD PRESSURE: 134 MMHG | TEMPERATURE: 99 F | HEART RATE: 85 BPM | WEIGHT: 184.97 LBS | OXYGEN SATURATION: 99 % | RESPIRATION RATE: 17 BRPM

## 2025-07-09 VITALS — SYSTOLIC BLOOD PRESSURE: 114 MMHG | HEART RATE: 83 BPM | DIASTOLIC BLOOD PRESSURE: 75 MMHG | RESPIRATION RATE: 15 BRPM

## 2025-07-09 DIAGNOSIS — Z90.12 ACQUIRED ABSENCE OF LEFT BREAST AND NIPPLE: Chronic | ICD-10-CM

## 2025-07-09 DIAGNOSIS — Z94.84 STEM CELLS TRANSPLANT STATUS: Chronic | ICD-10-CM

## 2025-07-09 DIAGNOSIS — Z90.89 ACQUIRED ABSENCE OF OTHER ORGANS: Chronic | ICD-10-CM

## 2025-07-09 DIAGNOSIS — H25.11 AGE-RELATED NUCLEAR CATARACT, RIGHT EYE: ICD-10-CM

## 2025-07-09 LAB
GLUCOSE BLDC GLUCOMTR-MCNC: 302 MG/DL — HIGH (ref 70–99)
GLUCOSE BLDC GLUCOMTR-MCNC: 314 MG/DL — HIGH (ref 70–99)
GLUCOSE BLDC GLUCOMTR-MCNC: 339 MG/DL — HIGH (ref 70–99)

## 2025-07-09 PROCEDURE — 82962 GLUCOSE BLOOD TEST: CPT

## 2025-07-09 PROCEDURE — V2632: CPT

## 2025-07-09 NOTE — ASU PATIENT PROFILE, ADULT - NSICDXPASTSURGICALHX_GEN_ALL_CORE_FT
PAST SURGICAL HISTORY:  H/O left mastectomy     H/O stem cell transplant     History of tonsillectomy     S/P cholecystectomy     Single delivery by  section

## 2025-07-09 NOTE — ASU PATIENT PROFILE, ADULT - FALL HARM RISK - HARM RISK INTERVENTIONS

## 2025-07-14 DIAGNOSIS — H25.811 COMBINED FORMS OF AGE-RELATED CATARACT, RIGHT EYE: ICD-10-CM

## 2025-07-14 DIAGNOSIS — Z79.85 LONG-TERM (CURRENT) USE OF INJECTABLE NON-INSULIN ANTIDIABETIC DRUGS: ICD-10-CM

## 2025-07-14 DIAGNOSIS — Z79.84 LONG TERM (CURRENT) USE OF ORAL HYPOGLYCEMIC DRUGS: ICD-10-CM

## 2025-07-14 DIAGNOSIS — Z79.82 LONG TERM (CURRENT) USE OF ASPIRIN: ICD-10-CM

## 2025-07-14 DIAGNOSIS — Z91.040 LATEX ALLERGY STATUS: ICD-10-CM

## 2025-07-14 PROBLEM — C90.00 MULTIPLE MYELOMA NOT HAVING ACHIEVED REMISSION: Chronic | Status: ACTIVE | Noted: 2025-07-09

## 2025-07-23 ENCOUNTER — OUTPATIENT (OUTPATIENT)
Dept: OUTPATIENT SERVICES | Facility: HOSPITAL | Age: 70
LOS: 1 days | Discharge: HOME | End: 2025-07-23
Payer: MEDICARE

## 2025-07-23 VITALS
HEART RATE: 65 BPM | RESPIRATION RATE: 155 BRPM | HEIGHT: 64 IN | SYSTOLIC BLOOD PRESSURE: 116 MMHG | DIASTOLIC BLOOD PRESSURE: 77 MMHG | WEIGHT: 184.97 LBS | TEMPERATURE: 98 F | OXYGEN SATURATION: 99 %

## 2025-07-23 VITALS — HEART RATE: 76 BPM | DIASTOLIC BLOOD PRESSURE: 72 MMHG | SYSTOLIC BLOOD PRESSURE: 110 MMHG

## 2025-07-23 DIAGNOSIS — H25.812 COMBINED FORMS OF AGE-RELATED CATARACT, LEFT EYE: ICD-10-CM

## 2025-07-23 DIAGNOSIS — Z90.89 ACQUIRED ABSENCE OF OTHER ORGANS: Chronic | ICD-10-CM

## 2025-07-23 DIAGNOSIS — Z94.84 STEM CELLS TRANSPLANT STATUS: Chronic | ICD-10-CM

## 2025-07-23 DIAGNOSIS — Z98.41 CATARACT EXTRACTION STATUS, RIGHT EYE: Chronic | ICD-10-CM

## 2025-07-23 DIAGNOSIS — Z90.12 ACQUIRED ABSENCE OF LEFT BREAST AND NIPPLE: Chronic | ICD-10-CM

## 2025-07-23 LAB — GLUCOSE BLDC GLUCOMTR-MCNC: 215 MG/DL — HIGH (ref 70–99)

## 2025-07-23 PROCEDURE — 82962 GLUCOSE BLOOD TEST: CPT

## 2025-07-23 PROCEDURE — V2632: CPT

## 2025-07-23 RX ORDER — SEMAGLUTIDE 1 MG/.5ML
0.5 INJECTION, SOLUTION SUBCUTANEOUS
Refills: 0 | DISCHARGE

## 2025-07-23 NOTE — ASU PATIENT PROFILE, ADULT - FALL HARM RISK - HARM RISK INTERVENTIONS

## 2025-07-23 NOTE — ASU PATIENT PROFILE, ADULT - VISION (WITH CORRECTIVE LENSES IF THE PATIENT USUALLY WEARS THEM):
-- DO NOT REPLY / DO NOT REPLY ALL --  -- Message is from Engagement Center Operations ([facility]) --    General Patient Message: Pharmacy requesting a call back on patient Domperidone prescription regarding directions.     Caller Information       Type Contact Phone/Fax    08/17/2023 05:09 PM CDT Phone (Incoming) [facility] (Other) [phone]        Alternative phone number: [phone]    Can a detailed message be left? Yes    Message Turnaround: [place]:    Refer to site's KB page for routing instructions    Please give this turnaround time to the caller:   \"You can expect to receive a response 1-3 business days after your provider's clinical team reviews the message\"               Normal vision: sees adequately in most situations; can see medication labels, newsprint

## 2025-07-23 NOTE — ASU PATIENT PROFILE, ADULT - NSICDXPASTMEDICALHX_GEN_ALL_CORE_FT
PAST MEDICAL HISTORY:  DM (diabetes mellitus)     High cholesterol     Multiple myeloma      PAST MEDICAL HISTORY:  DM (diabetes mellitus)     High cholesterol     Multiple myeloma     CRIS on CPAP

## 2025-07-23 NOTE — ASU PATIENT PROFILE, ADULT - NSICDXPASTSURGICALHX_GEN_ALL_CORE_FT
PAST SURGICAL HISTORY:  H/O left mastectomy     H/O stem cell transplant     History of tonsillectomy     S/P cataract extraction and insertion of intraocular lens, right     S/P cholecystectomy     Single delivery by  section

## 2025-07-25 DIAGNOSIS — E11.9 TYPE 2 DIABETES MELLITUS WITHOUT COMPLICATIONS: ICD-10-CM

## 2025-07-25 DIAGNOSIS — C90.01 MULTIPLE MYELOMA IN REMISSION: ICD-10-CM

## 2025-07-25 DIAGNOSIS — H25.812 COMBINED FORMS OF AGE-RELATED CATARACT, LEFT EYE: ICD-10-CM

## 2025-07-25 DIAGNOSIS — E78.00 PURE HYPERCHOLESTEROLEMIA, UNSPECIFIED: ICD-10-CM

## 2025-07-25 DIAGNOSIS — G47.33 OBSTRUCTIVE SLEEP APNEA (ADULT) (PEDIATRIC): ICD-10-CM
